# Patient Record
Sex: FEMALE | Race: OTHER | NOT HISPANIC OR LATINO | URBAN - METROPOLITAN AREA
[De-identification: names, ages, dates, MRNs, and addresses within clinical notes are randomized per-mention and may not be internally consistent; named-entity substitution may affect disease eponyms.]

---

## 2018-07-21 ENCOUNTER — EMERGENCY (EMERGENCY)
Facility: HOSPITAL | Age: 33
LOS: 1 days | Discharge: LEFT BEFORE DISCHARGE | End: 2018-07-21
Attending: EMERGENCY MEDICINE
Payer: COMMERCIAL

## 2018-07-21 VITALS
RESPIRATION RATE: 15 BRPM | TEMPERATURE: 98 F | HEART RATE: 84 BPM | SYSTOLIC BLOOD PRESSURE: 108 MMHG | DIASTOLIC BLOOD PRESSURE: 67 MMHG | OXYGEN SATURATION: 100 %

## 2018-07-21 VITALS
TEMPERATURE: 98 F | HEART RATE: 88 BPM | SYSTOLIC BLOOD PRESSURE: 111 MMHG | RESPIRATION RATE: 15 BRPM | DIASTOLIC BLOOD PRESSURE: 69 MMHG

## 2018-07-21 LAB
ALBUMIN SERPL ELPH-MCNC: 3.6 G/DL — SIGNIFICANT CHANGE UP (ref 3.3–5)
ALP SERPL-CCNC: 48 U/L — SIGNIFICANT CHANGE UP (ref 40–120)
ALT FLD-CCNC: 21 U/L — SIGNIFICANT CHANGE UP (ref 12–78)
ANION GAP SERPL CALC-SCNC: 8 MMOL/L — SIGNIFICANT CHANGE UP (ref 5–17)
APPEARANCE UR: CLEAR — SIGNIFICANT CHANGE UP
AST SERPL-CCNC: 17 U/L — SIGNIFICANT CHANGE UP (ref 15–37)
BASOPHILS # BLD AUTO: 0.02 K/UL — SIGNIFICANT CHANGE UP (ref 0–0.2)
BASOPHILS NFR BLD AUTO: 0.2 % — SIGNIFICANT CHANGE UP (ref 0–2)
BILIRUB SERPL-MCNC: 0.4 MG/DL — SIGNIFICANT CHANGE UP (ref 0.2–1.2)
BILIRUB UR-MCNC: NEGATIVE — SIGNIFICANT CHANGE UP
BUN SERPL-MCNC: 11 MG/DL — SIGNIFICANT CHANGE UP (ref 7–23)
CALCIUM SERPL-MCNC: 9 MG/DL — SIGNIFICANT CHANGE UP (ref 8.5–10.1)
CHLORIDE SERPL-SCNC: 105 MMOL/L — SIGNIFICANT CHANGE UP (ref 96–108)
CO2 SERPL-SCNC: 26 MMOL/L — SIGNIFICANT CHANGE UP (ref 22–31)
COLOR SPEC: YELLOW — SIGNIFICANT CHANGE UP
CREAT SERPL-MCNC: 0.52 MG/DL — SIGNIFICANT CHANGE UP (ref 0.5–1.3)
DIFF PNL FLD: NEGATIVE — SIGNIFICANT CHANGE UP
EOSINOPHIL # BLD AUTO: 0.05 K/UL — SIGNIFICANT CHANGE UP (ref 0–0.5)
EOSINOPHIL NFR BLD AUTO: 0.6 % — SIGNIFICANT CHANGE UP (ref 0–6)
GLUCOSE SERPL-MCNC: 82 MG/DL — SIGNIFICANT CHANGE UP (ref 70–99)
GLUCOSE UR QL: NEGATIVE — SIGNIFICANT CHANGE UP
HCG SERPL-ACNC: <1 MIU/ML — SIGNIFICANT CHANGE UP
HCT VFR BLD CALC: 39.7 % — SIGNIFICANT CHANGE UP (ref 34.5–45)
HGB BLD-MCNC: 13.7 G/DL — SIGNIFICANT CHANGE UP (ref 11.5–15.5)
IMM GRANULOCYTES NFR BLD AUTO: 0.2 % — SIGNIFICANT CHANGE UP (ref 0–1.5)
KETONES UR-MCNC: NEGATIVE — SIGNIFICANT CHANGE UP
LACTATE SERPL-SCNC: 1.3 MMOL/L — SIGNIFICANT CHANGE UP (ref 0.7–2)
LEUKOCYTE ESTERASE UR-ACNC: NEGATIVE — SIGNIFICANT CHANGE UP
LIDOCAIN IGE QN: 113 U/L — SIGNIFICANT CHANGE UP (ref 73–393)
LYMPHOCYTES # BLD AUTO: 1.8 K/UL — SIGNIFICANT CHANGE UP (ref 1–3.3)
LYMPHOCYTES # BLD AUTO: 22.2 % — SIGNIFICANT CHANGE UP (ref 13–44)
MCHC RBC-ENTMCNC: 32.2 PG — SIGNIFICANT CHANGE UP (ref 27–34)
MCHC RBC-ENTMCNC: 34.5 GM/DL — SIGNIFICANT CHANGE UP (ref 32–36)
MCV RBC AUTO: 93.4 FL — SIGNIFICANT CHANGE UP (ref 80–100)
MONOCYTES # BLD AUTO: 0.48 K/UL — SIGNIFICANT CHANGE UP (ref 0–0.9)
MONOCYTES NFR BLD AUTO: 5.9 % — SIGNIFICANT CHANGE UP (ref 2–14)
NEUTROPHILS # BLD AUTO: 5.75 K/UL — SIGNIFICANT CHANGE UP (ref 1.8–7.4)
NEUTROPHILS NFR BLD AUTO: 70.9 % — SIGNIFICANT CHANGE UP (ref 43–77)
NITRITE UR-MCNC: NEGATIVE — SIGNIFICANT CHANGE UP
PH UR: 6.5 — SIGNIFICANT CHANGE UP (ref 5–8)
PLATELET # BLD AUTO: 262 K/UL — SIGNIFICANT CHANGE UP (ref 150–400)
POTASSIUM SERPL-MCNC: 4.2 MMOL/L — SIGNIFICANT CHANGE UP (ref 3.5–5.3)
POTASSIUM SERPL-SCNC: 4.2 MMOL/L — SIGNIFICANT CHANGE UP (ref 3.5–5.3)
PROT SERPL-MCNC: 7.4 G/DL — SIGNIFICANT CHANGE UP (ref 6–8.3)
PROT UR-MCNC: NEGATIVE — SIGNIFICANT CHANGE UP
RBC # BLD: 4.25 M/UL — SIGNIFICANT CHANGE UP (ref 3.8–5.2)
RBC # FLD: 11.9 % — SIGNIFICANT CHANGE UP (ref 10.3–14.5)
SODIUM SERPL-SCNC: 139 MMOL/L — SIGNIFICANT CHANGE UP (ref 135–145)
SP GR SPEC: 1.01 — SIGNIFICANT CHANGE UP (ref 1.01–1.02)
UROBILINOGEN FLD QL: NEGATIVE — SIGNIFICANT CHANGE UP
WBC # BLD: 8.12 K/UL — SIGNIFICANT CHANGE UP (ref 3.8–10.5)
WBC # FLD AUTO: 8.12 K/UL — SIGNIFICANT CHANGE UP (ref 3.8–10.5)

## 2018-07-21 PROCEDURE — 96374 THER/PROPH/DIAG INJ IV PUSH: CPT

## 2018-07-21 PROCEDURE — 83690 ASSAY OF LIPASE: CPT

## 2018-07-21 PROCEDURE — 99285 EMERGENCY DEPT VISIT HI MDM: CPT

## 2018-07-21 PROCEDURE — 74177 CT ABD & PELVIS W/CONTRAST: CPT

## 2018-07-21 PROCEDURE — 74177 CT ABD & PELVIS W/CONTRAST: CPT | Mod: 26

## 2018-07-21 PROCEDURE — 85027 COMPLETE CBC AUTOMATED: CPT

## 2018-07-21 PROCEDURE — 83605 ASSAY OF LACTIC ACID: CPT

## 2018-07-21 PROCEDURE — 84702 CHORIONIC GONADOTROPIN TEST: CPT

## 2018-07-21 PROCEDURE — 81003 URINALYSIS AUTO W/O SCOPE: CPT

## 2018-07-21 PROCEDURE — 80053 COMPREHEN METABOLIC PANEL: CPT

## 2018-07-21 PROCEDURE — 96361 HYDRATE IV INFUSION ADD-ON: CPT

## 2018-07-21 PROCEDURE — 99284 EMERGENCY DEPT VISIT MOD MDM: CPT | Mod: 25

## 2018-07-21 RX ORDER — SPIRONOLACTONE 25 MG/1
0 TABLET, FILM COATED ORAL
Qty: 0 | Refills: 0 | COMMUNITY

## 2018-07-21 RX ORDER — PIPERACILLIN AND TAZOBACTAM 4; .5 G/20ML; G/20ML
3.38 INJECTION, POWDER, LYOPHILIZED, FOR SOLUTION INTRAVENOUS ONCE
Qty: 0 | Refills: 0 | Status: COMPLETED | OUTPATIENT
Start: 2018-07-21 | End: 2018-07-21

## 2018-07-21 RX ORDER — ACETAMINOPHEN 500 MG
650 TABLET ORAL ONCE
Qty: 0 | Refills: 0 | Status: COMPLETED | OUTPATIENT
Start: 2018-07-21 | End: 2018-07-21

## 2018-07-21 RX ORDER — ONDANSETRON 8 MG/1
4 TABLET, FILM COATED ORAL ONCE
Qty: 0 | Refills: 0 | Status: COMPLETED | OUTPATIENT
Start: 2018-07-21 | End: 2018-07-21

## 2018-07-21 RX ORDER — IOHEXOL 300 MG/ML
30 INJECTION, SOLUTION INTRAVENOUS ONCE
Qty: 0 | Refills: 0 | Status: COMPLETED | OUTPATIENT
Start: 2018-07-21 | End: 2018-07-21

## 2018-07-21 RX ORDER — NORETHINDRONE AND ETHINYL ESTRADIOL 0.4-0.035
0 KIT ORAL
Qty: 0 | Refills: 0 | COMMUNITY

## 2018-07-21 RX ORDER — SODIUM CHLORIDE 9 MG/ML
1000 INJECTION INTRAMUSCULAR; INTRAVENOUS; SUBCUTANEOUS ONCE
Qty: 0 | Refills: 0 | Status: COMPLETED | OUTPATIENT
Start: 2018-07-21 | End: 2018-07-21

## 2018-07-21 RX ADMIN — IOHEXOL 30 MILLILITER(S): 300 INJECTION, SOLUTION INTRAVENOUS at 11:35

## 2018-07-21 RX ADMIN — ONDANSETRON 4 MILLIGRAM(S): 8 TABLET, FILM COATED ORAL at 11:35

## 2018-07-21 RX ADMIN — SODIUM CHLORIDE 1000 MILLILITER(S): 9 INJECTION INTRAMUSCULAR; INTRAVENOUS; SUBCUTANEOUS at 11:34

## 2018-07-21 RX ADMIN — Medication 650 MILLIGRAM(S): at 11:35

## 2018-07-21 RX ADMIN — SODIUM CHLORIDE 1000 MILLILITER(S): 9 INJECTION INTRAMUSCULAR; INTRAVENOUS; SUBCUTANEOUS at 16:17

## 2018-07-21 RX ADMIN — Medication 650 MILLIGRAM(S): at 16:17

## 2018-07-21 NOTE — ED PROVIDER NOTE - OBJECTIVE STATEMENT
31 yo female hx of PCOS c/o epigastric/LUQ pain x 1 days with associated nausea, no vomiting/diarrhea.  No fever/chills.  No hx of abdominal surgeries.

## 2018-07-21 NOTE — H&P ADULT - NSHPREVIEWOFSYSTEMS_GEN_ALL_CORE
REVIEW OF SYSTEMS:    CONSTITUTIONAL: No weakness, fevers or chills  EYES/ENT: No visual changes;  No vertigo or throat pain   NECK: No pain or stiffness  RESPIRATORY: No cough, wheezing, hemoptysis; No shortness of breath  CARDIOVASCULAR: No chest pain or palpitations  GASTROINTESTINAL: abdominal pain. nausea, vomiting, no  hematemesis; No diarrhea or constipation. No melena or hematochezia.  GENITOURINARY: No dysuria, frequency or hematuria  NEUROLOGICAL: No numbness or weakness  SKIN: No itching, burning, rashes, or lesions   All other review of systems is negative unless indicated above.

## 2018-07-21 NOTE — ED PROVIDER NOTE - PROGRESS NOTE DETAILS
patient requesting transfer to [facility].  Dr. Nelson accepting patient does not want to pay for ambulance charge since she his requesting for transfer.  Will leave against medical advice and drive to [place] with her family. The patient has decided to leave against medical advice (AMA).  I have made reasonable attempts to explain to the patient that leaving prior to completion of work up and treatment may result in recurrent or worsening of symptoms, severe permanent disability, pain and suffering, harm, injury, and/or death.  I have explained the risks, benefits, and alternatives to treatment as well as the attendant risks of refusing treatment at this time.  The patient has demonstrated comprehension and verbalizes understanding of these risks.  The patient has been told that they must return to the ER immediately for persistent or recurring symptoms, worsening symptoms, or any concerning symptoms.  The patient has also been informed that they may return to the ER immediately at any time if they change their mind and wish to resume care. The patient has been given the opportunity to ask questions and have them fully answered.

## 2018-07-21 NOTE — ED PROVIDER NOTE - CARE PLAN
Principal Discharge DX:	Left upper quadrant pain Principal Discharge DX:	Left upper quadrant pain  Secondary Diagnosis:	Acute appendicitis, unspecified acute appendicitis type

## 2018-07-21 NOTE — H&P ADULT - HISTORY OF PRESENT ILLNESS
33 yo fem c/o periumbilical, abdominal pain started yesterday along nausea, decreased appetite, pain 8/10, not relieved by anything. Pain worsened this am . CT Abd showed dilated appendix 13mm, no contrast filling appendix, thickening of appendix base consistent with appendicitis.

## 2018-07-21 NOTE — H&P ADULT - NSHPLABSRESULTS_GEN_ALL_CORE
Vital Signs Last 24 Hrs  T(C): 36.8 (21 Jul 2018 10:58), Max: 36.8 (21 Jul 2018 10:58)  T(F): 98.3 (21 Jul 2018 10:58), Max: 98.3 (21 Jul 2018 10:58)  HR: 88 (21 Jul 2018 10:58) (88 - 88)  BP: 111/69 (21 Jul 2018 10:58) (111/69 - 111/69)  BP(mean): --  RR: 15 (21 Jul 2018 10:58) (15 - 15)  SpO2: --                          13.7   8.12  )-----------( 262      ( 21 Jul 2018 11:29 )             39.7       07-21    139  |  105  |  11  ----------------------------<  82  4.2   |  26  |  0.52    Ca    9.0      21 Jul 2018 11:29    TPro  7.4  /  Alb  3.6  /  TBili  0.4  /  DBili  x   /  AST  17  /  ALT  21  /  AlkPhos  48  07-21      LIVER FUNCTIONS - ( 21 Jul 2018 11:29 )  Alb: 3.6 g/dL / Pro: 7.4 g/dL / ALK PHOS: 48 U/L / ALT: 21 U/L / AST: 17 U/L / GGT: x             MEDICATIONS  (STANDING):    MEDICATIONS  (PRN):      MEDICATIONS  (STANDING):      < from: CT Abdomen and Pelvis w/ Oral Cont and w/ IV Cont (07.21.18 @ 13:26) >    FINDINGS:      The liver, spleen and gallbladder appear unremarkable.    The adrenal glands and pancreas are unremarkable in appearance.    No hydroureteronephrosis is noted.    The abdominal aorta is normal in caliber.  No enlarged retroperitoneal lymphadenopathy is noted.    The evaluation of the stomach is limited without distention.  No bowel obstruction is noted.  No localized intra-abdominal fluid collection or pneumoperitoneum is   noted.    The appendix is dilated, measuring up to 13 mm, and does not fill with   contrast. It is fluid and debris filled, with thickening at the   appendiceal base and cecal apex. This finding represents an interval   change from prior exam.  No periappendiceal abscess is noted.    There is a trace amount of free fluid within the posterior pelvis.  The urinary bladder is moderately distended, correlate clinically with   urine output.    Impression:    CT findings as discussed, which may represent acute appendicitis,   recommend further clinical correlation.  This finding was discussed with Dr. Stiles at the time of interpretation on   7/21/2018.    < end of copied text >

## 2018-07-21 NOTE — H&P ADULT - NSHPPHYSICALEXAM_GEN_ALL_CORE
.  VITAL SIGNS:  T(C): 36.8 (07-21-18 @ 10:58), Max: 36.8 (07-21-18 @ 10:58)  T(F): 98.3 (07-21-18 @ 10:58), Max: 98.3 (07-21-18 @ 10:58)  HR: 88 (07-21-18 @ 10:58) (88 - 88)  BP: 111/69 (07-21-18 @ 10:58) (111/69 - 111/69)  BP(mean): --  RR: 15 (07-21-18 @ 10:58) (15 - 15)  SpO2: --  Wt(kg): --    PHYSICAL EXAM:    Constitutional: resting comfortably in bed; NAD  Eyes: PERRL, EOMI, anicteric sclera  ENT: no nasal discharge; uvula midline, no oropharyngeal erythema or exudates  Neck: supple; no JVD or thyromegaly  Respiratory: CTA B/L; no W/R/R, no retractions  Cardiac: +S1/S2; RRR; no murmurs  Gastrointestinal: soft, RLQ tenderness  Back: spine midline, no bony tenderness or step-offs; no CVAT B/L  Extremities: no clubbing or cyanosis; no peripheral edema  Musculoskeletal: NROM x4; no joint swelling, tenderness or erythema  Vascular: 2+ radial, femoral, DP/PT pulses B/L  Dermatologic: skin warm, dry and intact; no rashes, wounds, or scars  Lymphatic: no submandibular or cervical LAD  Neurologic: AAOx3; CNII-XII grossly intact; no focal deficits  Psychiatric: affect and characteristics of appearance, verbalizations, behaviors are appropriate

## 2018-07-21 NOTE — H&P ADULT - ASSESSMENT
33 yo fem with acute appendicitis  -Patient is thinking about going to [facility] where she works or staying here, awaiting for patient's decision  -Risk/benefit of lap appendectomy explained to patient, including but not limited to intrabdominal abscess, wound infection, DVT, PE, pneumonia  -IV cefotetan  -NPO

## 2020-12-23 NOTE — ED ADULT NURSE NOTE - PSH
Addended by: CHELA BORJAS on: 12/23/2020 02:45 PM     Modules accepted: Orders     No significant past surgical history

## 2021-09-07 ENCOUNTER — EMERGENCY (EMERGENCY)
Facility: HOSPITAL | Age: 36
LOS: 1 days | Discharge: HOME | End: 2021-09-07
Attending: EMERGENCY MEDICINE | Admitting: EMERGENCY MEDICINE
Payer: COMMERCIAL

## 2021-09-07 ENCOUNTER — TRANSCRIPTION ENCOUNTER (OUTPATIENT)
Age: 36
End: 2021-09-07

## 2021-09-07 VITALS
WEIGHT: 134.92 LBS | RESPIRATION RATE: 17 BRPM | TEMPERATURE: 98 F | OXYGEN SATURATION: 100 % | HEART RATE: 78 BPM | SYSTOLIC BLOOD PRESSURE: 112 MMHG | DIASTOLIC BLOOD PRESSURE: 70 MMHG | HEIGHT: 66 IN

## 2021-09-07 VITALS
TEMPERATURE: 98 F | HEART RATE: 80 BPM | DIASTOLIC BLOOD PRESSURE: 71 MMHG | OXYGEN SATURATION: 100 % | SYSTOLIC BLOOD PRESSURE: 106 MMHG | RESPIRATION RATE: 16 BRPM

## 2021-09-07 PROBLEM — E28.2 POLYCYSTIC OVARIAN SYNDROME: Chronic | Status: ACTIVE | Noted: 2018-07-21

## 2021-09-07 LAB
ALBUMIN SERPL ELPH-MCNC: 4 G/DL — SIGNIFICANT CHANGE UP (ref 3.3–5)
ALP SERPL-CCNC: 62 U/L — SIGNIFICANT CHANGE UP (ref 40–120)
ALT FLD-CCNC: 59 U/L — SIGNIFICANT CHANGE UP (ref 12–78)
ANION GAP SERPL CALC-SCNC: 3 MMOL/L — LOW (ref 5–17)
AST SERPL-CCNC: 32 U/L — SIGNIFICANT CHANGE UP (ref 15–37)
BASOPHILS # BLD AUTO: 0.03 K/UL — SIGNIFICANT CHANGE UP (ref 0–0.2)
BASOPHILS NFR BLD AUTO: 0.5 % — SIGNIFICANT CHANGE UP (ref 0–2)
BILIRUB SERPL-MCNC: 0.5 MG/DL — SIGNIFICANT CHANGE UP (ref 0.2–1.2)
BUN SERPL-MCNC: 11 MG/DL — SIGNIFICANT CHANGE UP (ref 7–23)
CALCIUM SERPL-MCNC: 9.5 MG/DL — SIGNIFICANT CHANGE UP (ref 8.5–10.1)
CHLORIDE SERPL-SCNC: 107 MMOL/L — SIGNIFICANT CHANGE UP (ref 96–108)
CO2 SERPL-SCNC: 29 MMOL/L — SIGNIFICANT CHANGE UP (ref 22–31)
CREAT SERPL-MCNC: 0.44 MG/DL — LOW (ref 0.5–1.3)
EOSINOPHIL # BLD AUTO: 0.02 K/UL — SIGNIFICANT CHANGE UP (ref 0–0.5)
EOSINOPHIL NFR BLD AUTO: 0.3 % — SIGNIFICANT CHANGE UP (ref 0–6)
GLUCOSE SERPL-MCNC: 83 MG/DL — SIGNIFICANT CHANGE UP (ref 70–99)
HCG SERPL-ACNC: <1 MIU/ML — SIGNIFICANT CHANGE UP
HCT VFR BLD CALC: 41.6 % — SIGNIFICANT CHANGE UP (ref 34.5–45)
HGB BLD-MCNC: 13.8 G/DL — SIGNIFICANT CHANGE UP (ref 11.5–15.5)
IMM GRANULOCYTES NFR BLD AUTO: 0.3 % — SIGNIFICANT CHANGE UP (ref 0–1.5)
LYMPHOCYTES # BLD AUTO: 2.59 K/UL — SIGNIFICANT CHANGE UP (ref 1–3.3)
LYMPHOCYTES # BLD AUTO: 39.2 % — SIGNIFICANT CHANGE UP (ref 13–44)
MCHC RBC-ENTMCNC: 31.4 PG — SIGNIFICANT CHANGE UP (ref 27–34)
MCHC RBC-ENTMCNC: 33.2 GM/DL — SIGNIFICANT CHANGE UP (ref 32–36)
MCV RBC AUTO: 94.5 FL — SIGNIFICANT CHANGE UP (ref 80–100)
MONOCYTES # BLD AUTO: 0.45 K/UL — SIGNIFICANT CHANGE UP (ref 0–0.9)
MONOCYTES NFR BLD AUTO: 6.8 % — SIGNIFICANT CHANGE UP (ref 2–14)
NEUTROPHILS # BLD AUTO: 3.5 K/UL — SIGNIFICANT CHANGE UP (ref 1.8–7.4)
NEUTROPHILS NFR BLD AUTO: 52.9 % — SIGNIFICANT CHANGE UP (ref 43–77)
NRBC # BLD: 0 /100 WBCS — SIGNIFICANT CHANGE UP (ref 0–0)
PLATELET # BLD AUTO: 219 K/UL — SIGNIFICANT CHANGE UP (ref 150–400)
POTASSIUM SERPL-MCNC: 4.6 MMOL/L — SIGNIFICANT CHANGE UP (ref 3.5–5.3)
POTASSIUM SERPL-SCNC: 4.6 MMOL/L — SIGNIFICANT CHANGE UP (ref 3.5–5.3)
PROT SERPL-MCNC: 7.5 G/DL — SIGNIFICANT CHANGE UP (ref 6–8.3)
RBC # BLD: 4.4 M/UL — SIGNIFICANT CHANGE UP (ref 3.8–5.2)
RBC # FLD: 11.9 % — SIGNIFICANT CHANGE UP (ref 10.3–14.5)
SODIUM SERPL-SCNC: 139 MMOL/L — SIGNIFICANT CHANGE UP (ref 135–145)
WBC # BLD: 6.61 K/UL — SIGNIFICANT CHANGE UP (ref 3.8–10.5)
WBC # FLD AUTO: 6.61 K/UL — SIGNIFICANT CHANGE UP (ref 3.8–10.5)

## 2021-09-07 PROCEDURE — 96365 THER/PROPH/DIAG IV INF INIT: CPT | Mod: XU

## 2021-09-07 PROCEDURE — 99284 EMERGENCY DEPT VISIT MOD MDM: CPT | Mod: 25

## 2021-09-07 PROCEDURE — 70487 CT MAXILLOFACIAL W/DYE: CPT | Mod: MA

## 2021-09-07 PROCEDURE — 80053 COMPREHEN METABOLIC PANEL: CPT

## 2021-09-07 PROCEDURE — 96375 TX/PRO/DX INJ NEW DRUG ADDON: CPT | Mod: XU

## 2021-09-07 PROCEDURE — 85025 COMPLETE CBC W/AUTO DIFF WBC: CPT

## 2021-09-07 PROCEDURE — 96366 THER/PROPH/DIAG IV INF ADDON: CPT

## 2021-09-07 PROCEDURE — 36415 COLL VENOUS BLD VENIPUNCTURE: CPT

## 2021-09-07 PROCEDURE — 70487 CT MAXILLOFACIAL W/DYE: CPT | Mod: 26,MA

## 2021-09-07 PROCEDURE — 99285 EMERGENCY DEPT VISIT HI MDM: CPT

## 2021-09-07 PROCEDURE — 84702 CHORIONIC GONADOTROPIN TEST: CPT

## 2021-09-07 RX ORDER — SODIUM CHLORIDE 9 MG/ML
1000 INJECTION INTRAMUSCULAR; INTRAVENOUS; SUBCUTANEOUS ONCE
Refills: 0 | Status: COMPLETED | OUTPATIENT
Start: 2021-09-07 | End: 2021-09-07

## 2021-09-07 RX ORDER — DEXAMETHASONE 0.5 MG/5ML
10 ELIXIR ORAL ONCE
Refills: 0 | Status: COMPLETED | OUTPATIENT
Start: 2021-09-07 | End: 2021-09-07

## 2021-09-07 RX ADMIN — SODIUM CHLORIDE 1000 MILLILITER(S): 9 INJECTION INTRAMUSCULAR; INTRAVENOUS; SUBCUTANEOUS at 13:33

## 2021-09-07 RX ADMIN — SODIUM CHLORIDE 1000 MILLILITER(S): 9 INJECTION INTRAMUSCULAR; INTRAVENOUS; SUBCUTANEOUS at 15:46

## 2021-09-07 RX ADMIN — Medication 100 MILLIGRAM(S): at 13:33

## 2021-09-07 RX ADMIN — Medication 102 MILLIGRAM(S): at 15:46

## 2021-09-07 RX ADMIN — Medication 900 MILLIGRAM(S): at 15:46

## 2021-09-07 NOTE — ED PROVIDER NOTE - PATIENT PORTAL LINK FT
You can access the FollowMyHealth Patient Portal offered by [facility] by registering at the following website: [email or web address]. By joining [employer]’s FollowMyHealth portal, you will also be able to view your health information using other applications (apps) compatible with our system.

## 2021-09-07 NOTE — ED PROVIDER NOTE - ENMT, MLM
Airway patent, tenderness to left upper gumline with no apical abscess. no induration to floor or mouth. no trismus, handles secretions without difficulty. mild left sided facial swelling present Airway patent, tenderness to left upper gumline and over left upper tooth with no apical abscess. no induration to floor or mouth. no trismus, handles secretions without difficulty. mild left sided facial swelling present

## 2021-09-07 NOTE — ED PROVIDER NOTE - OBJECTIVE STATEMENT
36 year old female with history of PCOS presents with left sided facial pain and swelling. had cavity filled 2-3 months to left upper tooth and then started Invisalign shortly afterwards. was having some sensitivity to the tooth on and off since then. increased pain about 4-5 days ago. was put on augmentin 4 days ago by doctor that she works with and has been using chlorhexidine mouth rinses. reports improvement and swelling started to get worse. was seen by dentist this am and switched to amoxicillin (rather than augmentin). had x-rays and told to follow up with endodontist for possible root canal. has appt scheduled in 6 days with endodontist. patient has concerns for possible abscess. pain moderate in nature. no fevers.

## 2021-09-07 NOTE — ED PROVIDER NOTE - PROGRESS NOTE DETAILS
Reevaluated patient at bedside.  Patient feeling improved.  Discussed the results of all diagnostic testing in ED and copies of all reports given. incidental finding of cyst in brain discussed. recommend follow up with neurology and outpatient MRI. referral to neurology provided. will have close follow up with oral surgeon. has appt scheduled in 5 days.   An opportunity to ask questions was given.  Discussed the importance of prompt, close medical follow-up.  Patient will return with any changes, concerns or persistent / worsening symptoms.  Understanding of all instructions verbalized.

## 2021-09-07 NOTE — ED PROVIDER NOTE - CARE PROVIDER_API CALL
Thao Newby  NEUROLOGY  [address] [facility] [address]  [place] [address]  Phone: [phone]  Fax: [phone]  Follow Up Time: 1-3 Days

## 2021-09-07 NOTE — ED ADULT NURSE NOTE - PRO INTERPRETER NEED 2
Date of Service: 04/03/2018    FOLLOWUP:  Incision and drainage of infected back epidermal inclusion cyst on 04/27/2018.    The incision is rapidly healing by secondary intention.  No pus.  No signs of infection.    PLAN:  Return to clinic in 2 weeks.      Dictated By: Kilo Talbert MD  Signing Provider: Kilo Talbert MD    /FABIAN ([id number])  DD: 04/04/2018 04:59:25 TD: 04/04/2018 08:49:04    Copy Sent To:    English

## 2021-09-07 NOTE — ED PROVIDER NOTE - ATTENDING CONTRIBUTION TO CARE
37 yo white female with worsening left upper toothache x few days. Had same area worked on  for cavitation few weeks ago. Tooth throbbing and sensitive. Dentist placed patient on Amox with little relief. No fever or chills. Exam revealed thin white female in NAD with moderate pain on percussion left upper 1st molar. No adenopathy. I agree with plan and management outlined by PA.

## 2021-09-07 NOTE — ED PROVIDER NOTE - NSFOLLOWUPINSTRUCTIONS_ED_ALL_ED_FT
start taking clindamycin since no improvement on augmentin   warm compresses to face  have close follow up with oral surgeon   incidental finding of cyst on brain, recommend outpatient follow up with neurology. may need outpatient MRI. referral to neurology provided         Dental Pain    Dental pain may be caused by many things, including:  •Tooth decay (cavities or caries).      •Infection.      •The inner part of the tooth being filled with pus (abscess).      •Injury.      Sometimes the cause of pain is unknown.  Your pain can vary. It may be mild or severe. You may have it all the time, or it may occur only when you are:  •Chewing.      •Exposed to hot or cold temperature.      •Eating or drinking sugary foods or beverages, such as soda or candy.        Follow these instructions at home:    Medicines     •Take over-the-counter and prescription medicines only as told by your doctor.      •If you were prescribed an antibiotic medicine, take it as told by your doctor. Do not stop taking the medicine even if you start to feel better.      Eating and drinking   • Do not eat foods or drinks that cause you pain. These include:  •Very hot or very cold foods or drinks.      •Sweet or sugary foods or drinks.          Managing pain and swelling      •Gargle with a salt-water mixture 3–4 times a day. To make this, dissolve ½–1 tsp of salt in 1 cup of warm water.    •If told, put ice on the painful area of your face:  •Put ice in a plastic bag.       •Place a towel between your skin and the bag.       •Leave the ice on for 20 minutes, 2–3 times a day.        Brushing your teeth     •Brush your teeth twice a day using a fluoride toothpaste.       •Floss your teeth once a day.      •Use a toothpaste made for sensitive teeth as told by your doctor.      •Use a soft toothbrush.      General instructions     • Do not apply heat to the outside of your face.      •Watch your dental pain. Let your doctor know if there are any changes.      •Keep all follow-up visits as told by your doctor. This is important.        Contact a doctor if:    •Your pain is not relieved by medicines.      •You have new symptoms.      •Your symptoms get worse.        Get help right away if:    •You cannot open your mouth.      •You are having trouble breathing or swallowing.      •You have a fever.      •Your face, neck, or jaw is swollen.        Summary    •Dental pain may be caused by many things, including tooth decay, injury, or infection. In some cases, the cause is not known.      •Your pain may be mild or severe. You may have pain all the time, or you may have it only when you eat or drink.      •Take over-the-counter and prescription medicines only as told by your doctor.      •Watch your dental pain for any changes. Let your doctor know if symptoms get worse.      This information is not intended to replace advice given to you by your health care provider. Make sure you discuss any questions you have with your health care provider.

## 2021-09-07 NOTE — ED ADULT TRIAGE NOTE - CHIEF COMPLAINT QUOTE
"Tiffanie been having issues with my tooth and now my cheek is swollen. Im on antibx but I don't think they are working"

## 2022-09-01 ENCOUNTER — OFFICE (OUTPATIENT)
Dept: URBAN - METROPOLITAN AREA CLINIC 109 | Facility: CLINIC | Age: 37
Setting detail: OPHTHALMOLOGY
End: 2022-09-01
Payer: COMMERCIAL

## 2022-09-01 ENCOUNTER — RX ONLY (RX ONLY)
Age: 37
End: 2022-09-01

## 2022-09-01 DIAGNOSIS — H16.142: ICD-10-CM

## 2022-09-01 PROCEDURE — 92002 INTRM OPH EXAM NEW PATIENT: CPT | Performed by: OPHTHALMOLOGY

## 2022-09-01 ASSESSMENT — CONFRONTATIONAL VISUAL FIELD TEST (CVF)
OD_FINDINGS: FULL
OS_FINDINGS: FULL

## 2022-09-01 ASSESSMENT — TONOMETRY
OD_IOP_MMHG: 10
OS_IOP_MMHG: 10

## 2022-09-01 ASSESSMENT — VISUAL ACUITY
OD_BCVA: 20/20
OS_BCVA: 20/20

## 2022-09-08 ENCOUNTER — RX ONLY (RX ONLY)
Age: 37
End: 2022-09-08

## 2022-09-08 ENCOUNTER — OFFICE (OUTPATIENT)
Dept: URBAN - METROPOLITAN AREA CLINIC 109 | Facility: CLINIC | Age: 37
Setting detail: OPHTHALMOLOGY
End: 2022-09-08
Payer: COMMERCIAL

## 2022-09-08 DIAGNOSIS — H40.013: ICD-10-CM

## 2022-09-08 DIAGNOSIS — H16.142: ICD-10-CM

## 2022-09-08 PROCEDURE — 99213 OFFICE O/P EST LOW 20 MIN: CPT | Performed by: OPHTHALMOLOGY

## 2022-09-08 PROCEDURE — 76514 ECHO EXAM OF EYE THICKNESS: CPT | Performed by: OPHTHALMOLOGY

## 2022-09-08 ASSESSMENT — PACHYMETRY
OS_CT_UM: 566
OS_CT_CORRECTION: -1
OD_CT_UM: 553
OD_CT_CORRECTION: -1

## 2022-09-08 ASSESSMENT — VISUAL ACUITY
OD_BCVA: 20/20
OS_BCVA: 20/20

## 2022-09-08 ASSESSMENT — TONOMETRY
OD_IOP_MMHG: 12
OS_IOP_MMHG: 15

## 2022-09-20 ENCOUNTER — OFFICE (OUTPATIENT)
Dept: URBAN - METROPOLITAN AREA CLINIC 109 | Facility: CLINIC | Age: 37
Setting detail: OPHTHALMOLOGY
End: 2022-09-20
Payer: COMMERCIAL

## 2022-09-20 DIAGNOSIS — H16.142: ICD-10-CM

## 2022-09-20 PROCEDURE — 92012 INTRM OPH EXAM EST PATIENT: CPT | Performed by: OPHTHALMOLOGY

## 2022-09-20 ASSESSMENT — TONOMETRY: OS_IOP_MMHG: 16

## 2022-09-20 ASSESSMENT — PACHYMETRY
OD_CT_UM: 553
OS_CT_CORRECTION: -1
OD_CT_CORRECTION: -1
OS_CT_UM: 566

## 2022-09-20 ASSESSMENT — CONFRONTATIONAL VISUAL FIELD TEST (CVF)
OD_FINDINGS: FULL
OS_FINDINGS: FULL

## 2022-09-20 ASSESSMENT — VISUAL ACUITY
OD_BCVA: 20/20
OS_BCVA: 20/20

## 2022-10-04 ENCOUNTER — OFFICE (OUTPATIENT)
Dept: URBAN - METROPOLITAN AREA CLINIC 109 | Facility: CLINIC | Age: 37
Setting detail: OPHTHALMOLOGY
End: 2022-10-04
Payer: COMMERCIAL

## 2022-10-04 DIAGNOSIS — H10.232: ICD-10-CM

## 2022-10-04 PROBLEM — H16.142: Status: RESOLVED | Noted: 2022-09-01 | Resolved: 2022-10-04

## 2022-10-04 PROCEDURE — 99213 OFFICE O/P EST LOW 20 MIN: CPT | Performed by: OPHTHALMOLOGY

## 2022-10-04 ASSESSMENT — PACHYMETRY
OS_CT_CORRECTION: -1
OD_CT_UM: 553
OD_CT_CORRECTION: -1
OS_CT_UM: 566

## 2022-10-04 ASSESSMENT — CONFRONTATIONAL VISUAL FIELD TEST (CVF)
OD_FINDINGS: FULL
OS_FINDINGS: FULL

## 2022-10-04 ASSESSMENT — VISUAL ACUITY
OD_BCVA: 20/20
OS_BCVA: 20/20

## 2022-10-04 ASSESSMENT — TONOMETRY: OS_IOP_MMHG: 13

## 2022-10-11 ENCOUNTER — OFFICE (OUTPATIENT)
Dept: URBAN - METROPOLITAN AREA CLINIC 109 | Facility: CLINIC | Age: 37
Setting detail: OPHTHALMOLOGY
End: 2022-10-11
Payer: COMMERCIAL

## 2022-10-11 DIAGNOSIS — H10.232: ICD-10-CM

## 2022-10-11 DIAGNOSIS — H43.393: ICD-10-CM

## 2022-10-11 DIAGNOSIS — H40.013: ICD-10-CM

## 2022-10-11 DIAGNOSIS — H16.222: ICD-10-CM

## 2022-10-11 PROCEDURE — 92201 OPSCPY EXTND RTA DRAW UNI/BI: CPT | Performed by: OPHTHALMOLOGY

## 2022-10-11 PROCEDURE — 92014 COMPRE OPH EXAM EST PT 1/>: CPT | Performed by: OPHTHALMOLOGY

## 2022-10-11 ASSESSMENT — SUPERFICIAL PUNCTATE KERATITIS (SPK): OS_SPK: T

## 2022-10-11 ASSESSMENT — CONFRONTATIONAL VISUAL FIELD TEST (CVF)
OD_FINDINGS: FULL
OS_FINDINGS: FULL

## 2022-10-11 ASSESSMENT — TONOMETRY
OS_IOP_MMHG: 14
OD_IOP_MMHG: 12

## 2022-10-11 ASSESSMENT — VISUAL ACUITY
OD_BCVA: 20/20
OS_BCVA: 20/20

## 2022-10-11 ASSESSMENT — LACRIMAL DUCT - ASSESSMENT: OS_LACRIMAL_DUCT: NO PUNCTAL DISCHARGE

## 2022-10-11 ASSESSMENT — PACHYMETRY
OD_CT_UM: 553
OS_CT_CORRECTION: -1
OS_CT_UM: 566
OD_CT_CORRECTION: -1

## 2022-10-11 ASSESSMENT — LID EXAM ASSESSMENTS: OS_COMMENTS: NO FOREIGN BODY WITH LID EVERTED

## 2022-10-13 ENCOUNTER — OFFICE (OUTPATIENT)
Dept: URBAN - METROPOLITAN AREA CLINIC 27 | Facility: CLINIC | Age: 37
Setting detail: OPHTHALMOLOGY
End: 2022-10-13
Payer: COMMERCIAL

## 2022-10-13 DIAGNOSIS — H10.45: ICD-10-CM

## 2022-10-13 DIAGNOSIS — H16.203: ICD-10-CM

## 2022-10-13 DIAGNOSIS — H16.223: ICD-10-CM

## 2022-10-13 PROBLEM — H40.013: Status: ACTIVE | Noted: 2022-09-08

## 2022-10-13 PROBLEM — H43.393 VITREOUS FLOATERS; BOTH EYES: Status: ACTIVE | Noted: 2022-10-11

## 2022-10-13 PROCEDURE — 92012 INTRM OPH EXAM EST PATIENT: CPT | Performed by: OPHTHALMOLOGY

## 2022-10-13 ASSESSMENT — SUPERFICIAL PUNCTATE KERATITIS (SPK)
OD_SPK: 1+
OS_SPK: 1+ 2+

## 2022-10-13 ASSESSMENT — KERATOMETRY
OD_AXISANGLE_DEGREES: 083
OS_K1POWER_DIOPTERS: 41.25
OS_AXISANGLE_DEGREES: 093
OS_K2POWER_DIOPTERS: 42.50
OD_K2POWER_DIOPTERS: 42.00
OD_K1POWER_DIOPTERS: 41.25

## 2022-10-13 ASSESSMENT — SPHEQUIV_DERIVED
OS_SPHEQUIV: -0.125
OD_SPHEQUIV: 0.375

## 2022-10-13 ASSESSMENT — REFRACTION_AUTOREFRACTION
OS_SPHERE: -0.25
OS_CYLINDER: +0.25
OD_SPHERE: +0.25
OS_AXIS: 093
OD_AXIS: 89
OD_CYLINDER: +0.25

## 2022-10-13 ASSESSMENT — LID EXAM ASSESSMENTS: OS_COMMENTS: NO FOREIGN BODY WITH LID EVERTED

## 2022-10-13 ASSESSMENT — PACHYMETRY
OS_CT_UM: 566
OD_CT_CORRECTION: -1
OS_CT_CORRECTION: -1
OD_CT_UM: 553

## 2022-10-13 ASSESSMENT — VISUAL ACUITY
OS_BCVA: 20/20
OD_BCVA: 20/20

## 2022-10-13 ASSESSMENT — AXIALLENGTH_DERIVED
OD_AL: 24.1473
OS_AL: 24.2556

## 2022-10-13 ASSESSMENT — TONOMETRY
OD_IOP_MMHG: 15
OS_IOP_MMHG: 15

## 2022-10-13 ASSESSMENT — LACRIMAL DUCT - ASSESSMENT: OS_LACRIMAL_DUCT: NO PUNCTAL DISCHARGE

## 2022-12-28 ENCOUNTER — OFFICE (OUTPATIENT)
Dept: URBAN - METROPOLITAN AREA CLINIC 109 | Facility: CLINIC | Age: 37
Setting detail: OPHTHALMOLOGY
End: 2022-12-28
Payer: COMMERCIAL

## 2022-12-28 DIAGNOSIS — H10.45: ICD-10-CM

## 2022-12-28 DIAGNOSIS — H00.025: ICD-10-CM

## 2022-12-28 DIAGNOSIS — H16.142: ICD-10-CM

## 2022-12-28 DIAGNOSIS — H16.042: ICD-10-CM

## 2022-12-28 DIAGNOSIS — H00.022: ICD-10-CM

## 2022-12-28 DIAGNOSIS — H16.221: ICD-10-CM

## 2022-12-28 PROCEDURE — 99213 OFFICE O/P EST LOW 20 MIN: CPT | Performed by: OPHTHALMOLOGY

## 2022-12-28 ASSESSMENT — PACHYMETRY
OD_CT_UM: 553
OS_CT_UM: 566
OD_CT_CORRECTION: -1
OS_CT_CORRECTION: -1

## 2022-12-28 ASSESSMENT — CONFRONTATIONAL VISUAL FIELD TEST (CVF)
OS_FINDINGS: FULL
OD_FINDINGS: FULL

## 2022-12-28 ASSESSMENT — TONOMETRY
OS_IOP_MMHG: 13
OD_IOP_MMHG: 13

## 2022-12-28 ASSESSMENT — LID EXAM ASSESSMENTS
OD_MEIBOMITIS: RLL 1+
OS_MEIBOMITIS: LLL 1+
OD_COMMENTS: M1MT0

## 2022-12-28 ASSESSMENT — VISUAL ACUITY
OD_BCVA: 20/20
OS_BCVA: 20/20

## 2022-12-28 ASSESSMENT — LACRIMAL DUCT - ASSESSMENT: OS_LACRIMAL_DUCT: NO PUNCTAL DISCHARGE

## 2023-03-05 ENCOUNTER — EMERGENCY (EMERGENCY)
Facility: HOSPITAL | Age: 38
LOS: 1 days | Discharge: HOME | End: 2023-03-05
Attending: STUDENT IN AN ORGANIZED HEALTH CARE EDUCATION/TRAINING PROGRAM | Admitting: STUDENT IN AN ORGANIZED HEALTH CARE EDUCATION/TRAINING PROGRAM
Payer: COMMERCIAL

## 2023-03-05 VITALS
DIASTOLIC BLOOD PRESSURE: 85 MMHG | RESPIRATION RATE: 18 BRPM | HEART RATE: 108 BPM | OXYGEN SATURATION: 98 % | SYSTOLIC BLOOD PRESSURE: 122 MMHG | TEMPERATURE: 99 F | HEIGHT: 66 IN | WEIGHT: 134.92 LBS

## 2023-03-05 VITALS
TEMPERATURE: 99 F | HEART RATE: 74 BPM | SYSTOLIC BLOOD PRESSURE: 115 MMHG | RESPIRATION RATE: 18 BRPM | OXYGEN SATURATION: 99 % | DIASTOLIC BLOOD PRESSURE: 66 MMHG

## 2023-03-05 DIAGNOSIS — Z87.19 PERSONAL HISTORY OF OTHER DISEASES OF THE DIGESTIVE SYSTEM: Chronic | ICD-10-CM

## 2023-03-05 DIAGNOSIS — Z90.49 ACQUIRED ABSENCE OF OTHER SPECIFIED PARTS OF DIGESTIVE TRACT: Chronic | ICD-10-CM

## 2023-03-05 LAB
ALBUMIN SERPL ELPH-MCNC: 3.4 G/DL — SIGNIFICANT CHANGE UP (ref 3.3–5)
ALP SERPL-CCNC: 64 U/L — SIGNIFICANT CHANGE UP (ref 40–120)
ALT FLD-CCNC: 19 U/L — SIGNIFICANT CHANGE UP (ref 12–78)
ANION GAP SERPL CALC-SCNC: 5 MMOL/L — SIGNIFICANT CHANGE UP (ref 5–17)
APTT BLD: 30.3 SEC — SIGNIFICANT CHANGE UP (ref 27.5–35.5)
AST SERPL-CCNC: 17 U/L — SIGNIFICANT CHANGE UP (ref 15–37)
BASOPHILS # BLD AUTO: 0.02 K/UL — SIGNIFICANT CHANGE UP (ref 0–0.2)
BASOPHILS NFR BLD AUTO: 0.4 % — SIGNIFICANT CHANGE UP (ref 0–2)
BILIRUB SERPL-MCNC: 0.3 MG/DL — SIGNIFICANT CHANGE UP (ref 0.2–1.2)
BUN SERPL-MCNC: 19 MG/DL — SIGNIFICANT CHANGE UP (ref 7–23)
CALCIUM SERPL-MCNC: 9.7 MG/DL — SIGNIFICANT CHANGE UP (ref 8.5–10.1)
CHLORIDE SERPL-SCNC: 103 MMOL/L — SIGNIFICANT CHANGE UP (ref 96–108)
CO2 SERPL-SCNC: 28 MMOL/L — SIGNIFICANT CHANGE UP (ref 22–31)
CREAT SERPL-MCNC: 0.66 MG/DL — SIGNIFICANT CHANGE UP (ref 0.5–1.3)
D DIMER BLD IA.RAPID-MCNC: 236 NG/ML DDU — HIGH
EGFR: 116 ML/MIN/1.73M2 — SIGNIFICANT CHANGE UP
EOSINOPHIL # BLD AUTO: 0.03 K/UL — SIGNIFICANT CHANGE UP (ref 0–0.5)
EOSINOPHIL NFR BLD AUTO: 0.6 % — SIGNIFICANT CHANGE UP (ref 0–6)
FLUAV AG NPH QL: SIGNIFICANT CHANGE UP
FLUBV AG NPH QL: SIGNIFICANT CHANGE UP
GLUCOSE SERPL-MCNC: 86 MG/DL — SIGNIFICANT CHANGE UP (ref 70–99)
HCG SERPL-ACNC: <1 MIU/ML — SIGNIFICANT CHANGE UP
HCT VFR BLD CALC: 42.4 % — SIGNIFICANT CHANGE UP (ref 34.5–45)
HGB BLD-MCNC: 13.7 G/DL — SIGNIFICANT CHANGE UP (ref 11.5–15.5)
IMM GRANULOCYTES NFR BLD AUTO: 0.2 % — SIGNIFICANT CHANGE UP (ref 0–0.9)
INR BLD: 0.97 RATIO — SIGNIFICANT CHANGE UP (ref 0.88–1.16)
LYMPHOCYTES # BLD AUTO: 2.08 K/UL — SIGNIFICANT CHANGE UP (ref 1–3.3)
LYMPHOCYTES # BLD AUTO: 39.8 % — SIGNIFICANT CHANGE UP (ref 13–44)
MCHC RBC-ENTMCNC: 30.6 PG — SIGNIFICANT CHANGE UP (ref 27–34)
MCHC RBC-ENTMCNC: 32.3 GM/DL — SIGNIFICANT CHANGE UP (ref 32–36)
MCV RBC AUTO: 94.6 FL — SIGNIFICANT CHANGE UP (ref 80–100)
MONOCYTES # BLD AUTO: 0.29 K/UL — SIGNIFICANT CHANGE UP (ref 0–0.9)
MONOCYTES NFR BLD AUTO: 5.5 % — SIGNIFICANT CHANGE UP (ref 2–14)
NEUTROPHILS # BLD AUTO: 2.8 K/UL — SIGNIFICANT CHANGE UP (ref 1.8–7.4)
NEUTROPHILS NFR BLD AUTO: 53.5 % — SIGNIFICANT CHANGE UP (ref 43–77)
NRBC # BLD: 0 /100 WBCS — SIGNIFICANT CHANGE UP (ref 0–0)
NT-PROBNP SERPL-SCNC: 16 PG/ML — SIGNIFICANT CHANGE UP (ref 0–125)
PLATELET # BLD AUTO: 269 K/UL — SIGNIFICANT CHANGE UP (ref 150–400)
POTASSIUM SERPL-MCNC: 4.3 MMOL/L — SIGNIFICANT CHANGE UP (ref 3.5–5.3)
POTASSIUM SERPL-SCNC: 4.3 MMOL/L — SIGNIFICANT CHANGE UP (ref 3.5–5.3)
PROT SERPL-MCNC: 8.3 G/DL — SIGNIFICANT CHANGE UP (ref 6–8.3)
PROTHROM AB SERPL-ACNC: 11.4 SEC — SIGNIFICANT CHANGE UP (ref 10.5–13.4)
RBC # BLD: 4.48 M/UL — SIGNIFICANT CHANGE UP (ref 3.8–5.2)
RBC # FLD: 11.8 % — SIGNIFICANT CHANGE UP (ref 10.3–14.5)
RSV RNA NPH QL NAA+NON-PROBE: SIGNIFICANT CHANGE UP
SARS-COV-2 RNA SPEC QL NAA+PROBE: DETECTED
SODIUM SERPL-SCNC: 136 MMOL/L — SIGNIFICANT CHANGE UP (ref 135–145)
TROPONIN I, HIGH SENSITIVITY RESULT: <3 NG/L — SIGNIFICANT CHANGE UP
TSH SERPL-MCNC: 1.46 UIU/ML — SIGNIFICANT CHANGE UP (ref 0.36–3.74)
WBC # BLD: 5.23 K/UL — SIGNIFICANT CHANGE UP (ref 3.8–10.5)
WBC # FLD AUTO: 5.23 K/UL — SIGNIFICANT CHANGE UP (ref 3.8–10.5)

## 2023-03-05 PROCEDURE — 84443 ASSAY THYROID STIM HORMONE: CPT

## 2023-03-05 PROCEDURE — 80053 COMPREHEN METABOLIC PANEL: CPT

## 2023-03-05 PROCEDURE — 83880 ASSAY OF NATRIURETIC PEPTIDE: CPT

## 2023-03-05 PROCEDURE — 99285 EMERGENCY DEPT VISIT HI MDM: CPT

## 2023-03-05 PROCEDURE — 71275 CT ANGIOGRAPHY CHEST: CPT | Mod: 26,MA

## 2023-03-05 PROCEDURE — 84702 CHORIONIC GONADOTROPIN TEST: CPT

## 2023-03-05 PROCEDURE — 85025 COMPLETE CBC W/AUTO DIFF WBC: CPT

## 2023-03-05 PROCEDURE — 36415 COLL VENOUS BLD VENIPUNCTURE: CPT

## 2023-03-05 PROCEDURE — 93005 ELECTROCARDIOGRAM TRACING: CPT

## 2023-03-05 PROCEDURE — 93971 EXTREMITY STUDY: CPT

## 2023-03-05 PROCEDURE — 71275 CT ANGIOGRAPHY CHEST: CPT | Mod: MA

## 2023-03-05 PROCEDURE — 85610 PROTHROMBIN TIME: CPT

## 2023-03-05 PROCEDURE — 87637 SARSCOV2&INF A&B&RSV AMP PRB: CPT

## 2023-03-05 PROCEDURE — 93971 EXTREMITY STUDY: CPT | Mod: 26,LT

## 2023-03-05 PROCEDURE — 85730 THROMBOPLASTIN TIME PARTIAL: CPT

## 2023-03-05 PROCEDURE — 99285 EMERGENCY DEPT VISIT HI MDM: CPT | Mod: 25

## 2023-03-05 PROCEDURE — 85379 FIBRIN DEGRADATION QUANT: CPT

## 2023-03-05 PROCEDURE — 93010 ELECTROCARDIOGRAM REPORT: CPT

## 2023-03-05 PROCEDURE — 84484 ASSAY OF TROPONIN QUANT: CPT

## 2023-03-05 NOTE — ED ADULT NURSE REASSESSMENT NOTE - NS ED NURSE REASSESS COMMENT FT1
1303:  patient is d/c.  iv removed, site benign.  the patient was given all d/c papers and instructed to f/u with her pmd. if her symptoms persist or worsen, it was recommended that she f/u with her pmd or return to the er.  the patient states she does not need work note.  vitals recorded.  she ambulated out of the ed in stable condition, no distress and with all of her belongings.  all appropriate paperwork and results given to the patient.  montse duran.

## 2023-03-05 NOTE — ED ADULT NURSE NOTE - OBJECTIVE STATEMENT
the patient presents to the er.  she states that 1.5 weeks ago, she had covid.  she was resting over the past couple of days and has been checking her pulse ox, which was always okay.  Yesterday, she noted her HR was in the 120's.  This am, it went up to the 140's.  She denies any history of fever.  She states that she then developed some popliteal tenderness / ache and posterior left thigh discomfort and became concerned that she can possibly have a clot.  she denies any sob, but states she sometimes gets winded upon exertion.  she is alert / oriented x4.  no difficulty speaking, able to carry on conversation.  slight cough noted.  she is alert / oriented x4. iv access obtained, labs collected / swab collected and sent

## 2023-03-05 NOTE — ED PROVIDER NOTE - PHYSICAL EXAMINATION
Vital signs as available reviewed.  General:  No acute distress.  Head:  Normocephalic, atraumatic.  Eyes:  Conjunctiva pink, no icterus.  Cardiovascular:  Regular rate, no obvious murmur.  Respiratory:  Clear to auscultation, good air entry bilaterally.  Abdomen:  Soft, non-tender.  Musculoskeletal:  No obvious deformity. left lower extremity: no calf tenderness, normal color, no significant swelling, normal range of motion.  Neurologic: Alert and oriented, moving all extremities.  Skin:  Warm and dry.

## 2023-03-05 NOTE — ED PROVIDER NOTE - EKG #1 DATE/TIME
Physician Progress Note      PATIENTTabrenden Sanchez  CSN #:                  [id number]  :                       1979  ADMIT DATE:       2022 7:38 AM  DISCH DATE:        2022 4:21 PM  RESPONDING  PROVIDER #:        Declan Robertson CNP          QUERY TEXT:    Pt admitted for loop colostomy reversal.  Pt noted to have hyponatremia on   undated problem list with Na level of 129-132. If possible, please document in the progress notes status of hyponatremia    The medical record reflects the following:  Risk Factors: DM, htn, insulin pump in place, kidney disease  Clinical Indicators: Na 130>132>129>131  Treatment: IVF, daily lab monitoring    Thank you please contact me for any questions! Jen Grey RN, CCDS, CDI   Supervisor [phone]  Options provided:  -- Hyponatremia present on admission  -- Clinically insignificant low serum sodium with history of hyponatremia  -- Other - I will add my own diagnosis  -- Disagree - Not applicable / Not valid  -- Disagree - Clinically unable to determine / Unknown  -- Refer to Clinical Documentation Reviewer    PROVIDER RESPONSE TEXT:    This patient has hyponatremia present on admission.     Query created by: Flor Del Real on 2022 1:26 PM      Electronically signed by:  Declan Robertson CNP 2022 9:08 AM 05-Mar-2023 10:58

## 2023-03-05 NOTE — ED ADULT TRIAGE NOTE - CHIEF COMPLAINT QUOTE
Patient walked in with c/o high heart rate 120s-140s, also with left leg posterior knee and posterior thigh pain. Patient reports she feels "winded" when she goes up and down the stairs. Tested positive for covid last Saturday.  Patient takes OCPs for birth control. Denies other significant PMhx.

## 2023-03-05 NOTE — ED PROVIDER NOTE - PATIENT PORTAL LINK FT
You can access the FollowMyHealth Patient Portal offered by [facility] by registering at the following website: [email or web address]. By joining [employer]’s FollowMyHealth portal, you will also be able to view your health information using other applications (apps) compatible with our system.

## 2023-03-05 NOTE — ED ADULT NURSE NOTE - NSIMPLEMENTINTERV_GEN_ALL_ED
Implemented All Universal Safety Interventions:  [place] to call system. Call bell, personal items and telephone within reach. Instruct patient to call for assistance. Room bathroom lighting operational. Non-slip footwear when patient is off stretcher. Physically safe environment: no spills, clutter or unnecessary equipment. Stretcher in lowest position, wheels locked, appropriate side rails in place.

## 2023-03-05 NOTE — ED PROVIDER NOTE - CLINICAL SUMMARY MEDICAL DECISION MAKING FREE TEXT BOX
here with atraumatic leg pain, shortness of breath, recent covid, on ocps. check labs, us/CT, hydrate, monitor. here with atraumatic leg pain, shortness of breath, recent covid, on ocps. check labs, us/CT, hydrate, monitor.    work up un-revealing except patient still covid+. patient aware of results. will discharge.

## 2023-03-05 NOTE — ED PROVIDER NOTE - CARE PLAN
Principal Discharge DX:	2019 novel coronavirus disease (COVID-19)  Secondary Diagnosis:	Tachycardia  Secondary Diagnosis:	Leg pain   1

## 2023-03-05 NOTE — ED PROVIDER NOTE - OBJECTIVE STATEMENT
37-year-old female no reported medical history, has PCOS per EMR here complaining of palpitations, left leg pain, shortness of breath.  Patient reports about a week ago she had COVID and has not been feeling that bad.  Patient reports for the past few days she has been more tired and has been in bed.  She is checking her pulse ox at home and has noted that her pulse rate was in the 140s. Leg pain in left leg behind knee and in thigh, worse with standing, no trauma.  No chest pain, no fevers, no cough, no nausea vomiting or stomach pain.  Patient taking OCPs.  PMD Dr. Connolly (sp?)

## 2023-04-15 NOTE — ED PROVIDER NOTE - CLINICAL SUMMARY MEDICAL DECISION MAKING FREE TEXT BOX
left upper dental pain with left sided facial swelling. concerns for dental infection vs abscess. will check labs, IV abx (no improvement on oral Augmentin, will consider changing to clindamycin), CT to eval for abscess
Audio/Video

## 2023-05-23 NOTE — ED ADULT NURSE NOTE - NS ED NURSE RECORD ANOTHER HT AND WT
Still unable to get access. Total of X11 sticks. APRN aware.       Maximo Ervin RN  05/23/23 6766 Yes

## 2023-07-23 ENCOUNTER — EMERGENCY (EMERGENCY)
Facility: HOSPITAL | Age: 38
LOS: 1 days | Discharge: HOME | End: 2023-07-23
Attending: STUDENT IN AN ORGANIZED HEALTH CARE EDUCATION/TRAINING PROGRAM | Admitting: STUDENT IN AN ORGANIZED HEALTH CARE EDUCATION/TRAINING PROGRAM
Payer: COMMERCIAL

## 2023-07-23 VITALS
WEIGHT: 134.92 LBS | SYSTOLIC BLOOD PRESSURE: 125 MMHG | HEIGHT: 66 IN | RESPIRATION RATE: 18 BRPM | OXYGEN SATURATION: 100 % | HEART RATE: 75 BPM | DIASTOLIC BLOOD PRESSURE: 81 MMHG | TEMPERATURE: 99 F

## 2023-07-23 VITALS
OXYGEN SATURATION: 99 % | RESPIRATION RATE: 18 BRPM | HEART RATE: 72 BPM | SYSTOLIC BLOOD PRESSURE: 120 MMHG | DIASTOLIC BLOOD PRESSURE: 80 MMHG | TEMPERATURE: 98 F

## 2023-07-23 DIAGNOSIS — Z87.19 PERSONAL HISTORY OF OTHER DISEASES OF THE DIGESTIVE SYSTEM: Chronic | ICD-10-CM

## 2023-07-23 DIAGNOSIS — Z90.49 ACQUIRED ABSENCE OF OTHER SPECIFIED PARTS OF DIGESTIVE TRACT: Chronic | ICD-10-CM

## 2023-07-23 LAB
ALBUMIN SERPL ELPH-MCNC: 3.5 G/DL — SIGNIFICANT CHANGE UP (ref 3.3–5)
ALP SERPL-CCNC: 55 U/L — SIGNIFICANT CHANGE UP (ref 40–120)
ALT FLD-CCNC: 20 U/L — SIGNIFICANT CHANGE UP (ref 12–78)
ANION GAP SERPL CALC-SCNC: 4 MMOL/L — LOW (ref 5–17)
AST SERPL-CCNC: 14 U/L — LOW (ref 15–37)
BASOPHILS # BLD AUTO: 0.02 K/UL — SIGNIFICANT CHANGE UP (ref 0–0.2)
BASOPHILS NFR BLD AUTO: 0.3 % — SIGNIFICANT CHANGE UP (ref 0–2)
BILIRUB SERPL-MCNC: 0.2 MG/DL — SIGNIFICANT CHANGE UP (ref 0.2–1.2)
BUN SERPL-MCNC: 14 MG/DL — SIGNIFICANT CHANGE UP (ref 7–23)
CALCIUM SERPL-MCNC: 9.2 MG/DL — SIGNIFICANT CHANGE UP (ref 8.5–10.1)
CHLORIDE SERPL-SCNC: 108 MMOL/L — SIGNIFICANT CHANGE UP (ref 96–108)
CO2 SERPL-SCNC: 27 MMOL/L — SIGNIFICANT CHANGE UP (ref 22–31)
CREAT SERPL-MCNC: 0.5 MG/DL — SIGNIFICANT CHANGE UP (ref 0.5–1.3)
D DIMER BLD IA.RAPID-MCNC: <150 NG/ML DDU — SIGNIFICANT CHANGE UP
EGFR: 124 ML/MIN/1.73M2 — SIGNIFICANT CHANGE UP
EOSINOPHIL # BLD AUTO: 0.05 K/UL — SIGNIFICANT CHANGE UP (ref 0–0.5)
EOSINOPHIL NFR BLD AUTO: 0.6 % — SIGNIFICANT CHANGE UP (ref 0–6)
GLUCOSE SERPL-MCNC: 104 MG/DL — HIGH (ref 70–99)
HCG SERPL-ACNC: <1 MIU/ML — SIGNIFICANT CHANGE UP
HCT VFR BLD CALC: 39.6 % — SIGNIFICANT CHANGE UP (ref 34.5–45)
HGB BLD-MCNC: 12.7 G/DL — SIGNIFICANT CHANGE UP (ref 11.5–15.5)
IMM GRANULOCYTES NFR BLD AUTO: 0.3 % — SIGNIFICANT CHANGE UP (ref 0–0.9)
LYMPHOCYTES # BLD AUTO: 3.22 K/UL — SIGNIFICANT CHANGE UP (ref 1–3.3)
LYMPHOCYTES # BLD AUTO: 41.7 % — SIGNIFICANT CHANGE UP (ref 13–44)
MCHC RBC-ENTMCNC: 31.4 PG — SIGNIFICANT CHANGE UP (ref 27–34)
MCHC RBC-ENTMCNC: 32.1 GM/DL — SIGNIFICANT CHANGE UP (ref 32–36)
MCV RBC AUTO: 98 FL — SIGNIFICANT CHANGE UP (ref 80–100)
MONOCYTES # BLD AUTO: 0.54 K/UL — SIGNIFICANT CHANGE UP (ref 0–0.9)
MONOCYTES NFR BLD AUTO: 7 % — SIGNIFICANT CHANGE UP (ref 2–14)
NEUTROPHILS # BLD AUTO: 3.88 K/UL — SIGNIFICANT CHANGE UP (ref 1.8–7.4)
NEUTROPHILS NFR BLD AUTO: 50.1 % — SIGNIFICANT CHANGE UP (ref 43–77)
NRBC # BLD: 0 /100 WBCS — SIGNIFICANT CHANGE UP (ref 0–0)
PLATELET # BLD AUTO: 240 K/UL — SIGNIFICANT CHANGE UP (ref 150–400)
POTASSIUM SERPL-MCNC: 4.4 MMOL/L — SIGNIFICANT CHANGE UP (ref 3.5–5.3)
POTASSIUM SERPL-SCNC: 4.4 MMOL/L — SIGNIFICANT CHANGE UP (ref 3.5–5.3)
PROT SERPL-MCNC: 7.4 G/DL — SIGNIFICANT CHANGE UP (ref 6–8.3)
RBC # BLD: 4.04 M/UL — SIGNIFICANT CHANGE UP (ref 3.8–5.2)
RBC # FLD: 12 % — SIGNIFICANT CHANGE UP (ref 10.3–14.5)
SODIUM SERPL-SCNC: 139 MMOL/L — SIGNIFICANT CHANGE UP (ref 135–145)
TROPONIN I, HIGH SENSITIVITY RESULT: <3 NG/L — SIGNIFICANT CHANGE UP
WBC # BLD: 7.73 K/UL — SIGNIFICANT CHANGE UP (ref 3.8–10.5)
WBC # FLD AUTO: 7.73 K/UL — SIGNIFICANT CHANGE UP (ref 3.8–10.5)

## 2023-07-23 PROCEDURE — 85379 FIBRIN DEGRADATION QUANT: CPT

## 2023-07-23 PROCEDURE — 85025 COMPLETE CBC W/AUTO DIFF WBC: CPT

## 2023-07-23 PROCEDURE — 93005 ELECTROCARDIOGRAM TRACING: CPT

## 2023-07-23 PROCEDURE — 84702 CHORIONIC GONADOTROPIN TEST: CPT

## 2023-07-23 PROCEDURE — 93010 ELECTROCARDIOGRAM REPORT: CPT

## 2023-07-23 PROCEDURE — 80053 COMPREHEN METABOLIC PANEL: CPT

## 2023-07-23 PROCEDURE — 71046 X-RAY EXAM CHEST 2 VIEWS: CPT | Mod: 26

## 2023-07-23 PROCEDURE — 71046 X-RAY EXAM CHEST 2 VIEWS: CPT

## 2023-07-23 PROCEDURE — 36415 COLL VENOUS BLD VENIPUNCTURE: CPT

## 2023-07-23 PROCEDURE — 99285 EMERGENCY DEPT VISIT HI MDM: CPT | Mod: 25

## 2023-07-23 PROCEDURE — 84484 ASSAY OF TROPONIN QUANT: CPT

## 2023-07-23 PROCEDURE — 99285 EMERGENCY DEPT VISIT HI MDM: CPT

## 2023-07-23 NOTE — ED PROVIDER NOTE - OBJECTIVE STATEMENT
37-year-old female with no significant past medical history is on oral contraceptive pills presents with intermittent chest pain for the last 2 days.  Pain initially started on the left side and now is on the right side.  Pain is worse with exertion as well as taking deep breaths.  Patient started having pain after she did some heavy lifting of water bottles but is concerned because of the pleuritic component of it.  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  Patient is concern for PE given that she is on OCPs.  Denies coughing or fevers or chills or leg swelling.  Does have family history of cardiac disease in her father.

## 2023-07-23 NOTE — ED PROVIDER NOTE - CLINICAL SUMMARY MEDICAL DECISION MAKING FREE TEXT BOX
37-year-old female with no significant past medical history is on oral contraceptive pills presents with intermittent chest pain for the last 2 days.  Pain initially started on the left side and now is on the right side.  Pain is worse with exertion as well as taking deep breaths.  Patient started having pain after she did some heavy lifting of water bottles but is concerned because of the pleuritic component of it.  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  Patient is concern for PE given that she is on OCPs.  Denies coughing or fevers or chills or leg swelling.  Does have family history of cardiac disease in her father.  Patient is well-appearing vitals within normal limits not PERC negative given OCPs we will check labs chest x-ray dimer and reassess if negative recommend for cardiology follow-up and treat for MSK pain

## 2023-07-23 NOTE — ED ADULT TRIAGE NOTE - NS ED TRIAGE AVPU SCALE
- - - Alert-The patient is alert, awake and responds to voice. The patient is oriented to time, place, and person. The triage nurse is able to obtain subjective information.

## 2023-07-23 NOTE — ED ADULT NURSE NOTE - OBJECTIVE STATEMENT
Pt c/o worsening CP and SOB for few days, pain worsening upon exertion. Denies n/v, dizziness, weakness. safety maintained, call bell within reach. Nursing care ongoing.

## 2023-07-23 NOTE — ED ADULT NURSE NOTE - NSFALLUNIVINTERV_ED_ALL_ED
Bed/Stretcher in lowest position, wheels locked, appropriate side rails in place/Call bell, personal items and telephone in reach/Instruct patient to call for assistance before getting out of bed/chair/stretcher/Non-slip footwear applied when patient is off stretcher/[place] to call system/Physically safe environment - no spills, clutter or unnecessary equipment/Purposeful proactive rounding/Room/bathroom lighting operational, light cord in reach

## 2023-07-23 NOTE — ED PROVIDER NOTE - CARE PROVIDER_API CALL
Jayleen Jung  Cardiology  [address] [place] [address]  Phone: [phone]  Fax: [phone]  Follow Up Time: 4-6 Days

## 2023-07-23 NOTE — ED PROVIDER NOTE - PHYSICAL EXAMINATION
Gen: Well appearing in NAD  Head: NC/AT  Neck: trachea midline  cv; rrr  Resp:  No distress, lungs clear  abdnontenderr  Ext: no deformities  Neuro:  A&O appears non focal  Skin:  Warm and dry as visualized  Psych:  Normal affect and mood

## 2023-07-23 NOTE — ED PROVIDER NOTE - PATIENT PORTAL LINK FT
You can access the FollowMyHealth Patient Portal offered by [facility] by registering at the following website: [email or web address]. By joining [employer]’s FollowMyHealth portal, you will also be able to view your health information using other applications (apps) compatible with our system.

## 2023-07-23 NOTE — ED PROVIDER NOTE - CARE PROVIDERS DIRECT ADDRESSES
[email or web address]

## 2023-07-23 NOTE — ED PROVIDER NOTE - NSFOLLOWUPINSTRUCTIONS_ED_ALL_ED_FT
1. TAKE ALL MEDICATIONS AS DIRECTED.    2. FOR PAIN OR FEVER YOU CAN TAKE ACETAMINOPHEN (TYLENOL) AS NEEDED, AS DIRECTED ON PACKAGING.  3. FOLLOW UP WITH YOUR PRIMARY DOCTOR WITHIN 5 DAYS AS DIRECTED.  4. IF YOU HAD LABS OR IMAGING DONE, YOU WERE GIVEN COPIES OF ALL LABS AND/OR IMAGING RESULTS FROM YOUR ER VISIT--PLEASE TAKE THEM WITH YOU TO YOUR FOLLOW UP APPOINTMENTS.  5. IF NEEDED, CALL PATIENT ACCESS SERVICES AT [phone] TO FIND A PRIMARY CARE PHYSICIAN.  OR CALL [phone] TO MAKE AN APPOINTMENT WITH THE CLINIC.  6. RETURN TO THE ER FOR ANY WORSENING SYMPTOMS OR CONCERNS.    Chest Pain    Chest pain can be caused by many different conditions which may or may not be dangerous. Causes include heartburn, lung infections, heart attack, blood clot in lungs, skin infections, strain or damage to muscle, cartilage, or bones, etc. In addition to a history and physical examination, an electrocardiogram (ECG) or other lab tests may have been performed to determine the cause of your chest pain. Follow up with your primary care provider or with a cardiologist as instructed.     SEEK IMMEDIATE MEDICAL CARE IF YOU HAVE ANY OF THE FOLLOWING SYMPTOMS: worsening chest pain, coughing up blood, unexplained back/neck/jaw pain, severe abdominal pain, dizziness or lightheadedness, fainting, shortness of breath, sweaty or clammy skin, vomiting, or racing heart beat. These symptoms may represent a serious problem that is an emergency. Do not wait to see if the symptoms will go away. Get medical help right away. Call [phone] and do not drive yourself to the hospital.

## 2023-08-08 ENCOUNTER — EMERGENCY (EMERGENCY)
Facility: HOSPITAL | Age: 38
LOS: 1 days | Discharge: HOME | End: 2023-08-08
Attending: STUDENT IN AN ORGANIZED HEALTH CARE EDUCATION/TRAINING PROGRAM | Admitting: STUDENT IN AN ORGANIZED HEALTH CARE EDUCATION/TRAINING PROGRAM
Payer: COMMERCIAL

## 2023-08-08 VITALS
SYSTOLIC BLOOD PRESSURE: 118 MMHG | RESPIRATION RATE: 16 BRPM | DIASTOLIC BLOOD PRESSURE: 78 MMHG | TEMPERATURE: 99 F | HEART RATE: 91 BPM | OXYGEN SATURATION: 99 %

## 2023-08-08 VITALS
WEIGHT: 134.92 LBS | TEMPERATURE: 99 F | HEIGHT: 66 IN | RESPIRATION RATE: 20 BRPM | OXYGEN SATURATION: 99 % | HEART RATE: 96 BPM | SYSTOLIC BLOOD PRESSURE: 122 MMHG | DIASTOLIC BLOOD PRESSURE: 84 MMHG

## 2023-08-08 DIAGNOSIS — Z90.49 ACQUIRED ABSENCE OF OTHER SPECIFIED PARTS OF DIGESTIVE TRACT: Chronic | ICD-10-CM

## 2023-08-08 DIAGNOSIS — Z87.19 PERSONAL HISTORY OF OTHER DISEASES OF THE DIGESTIVE SYSTEM: Chronic | ICD-10-CM

## 2023-08-08 LAB
ALBUMIN SERPL ELPH-MCNC: 3.6 G/DL — SIGNIFICANT CHANGE UP (ref 3.3–5)
ALP SERPL-CCNC: 53 U/L — SIGNIFICANT CHANGE UP (ref 40–120)
ALT FLD-CCNC: 22 U/L — SIGNIFICANT CHANGE UP (ref 12–78)
ANION GAP SERPL CALC-SCNC: 6 MMOL/L — SIGNIFICANT CHANGE UP (ref 5–17)
AST SERPL-CCNC: 17 U/L — SIGNIFICANT CHANGE UP (ref 15–37)
BASOPHILS # BLD AUTO: 0.03 K/UL — SIGNIFICANT CHANGE UP (ref 0–0.2)
BASOPHILS NFR BLD AUTO: 0.6 % — SIGNIFICANT CHANGE UP (ref 0–2)
BILIRUB SERPL-MCNC: 0.6 MG/DL — SIGNIFICANT CHANGE UP (ref 0.2–1.2)
BUN SERPL-MCNC: 14 MG/DL — SIGNIFICANT CHANGE UP (ref 7–23)
CALCIUM SERPL-MCNC: 9.4 MG/DL — SIGNIFICANT CHANGE UP (ref 8.5–10.1)
CHLORIDE SERPL-SCNC: 105 MMOL/L — SIGNIFICANT CHANGE UP (ref 96–108)
CO2 SERPL-SCNC: 28 MMOL/L — SIGNIFICANT CHANGE UP (ref 22–31)
CREAT SERPL-MCNC: 0.66 MG/DL — SIGNIFICANT CHANGE UP (ref 0.5–1.3)
EGFR: 115 ML/MIN/1.73M2 — SIGNIFICANT CHANGE UP
EOSINOPHIL # BLD AUTO: 0.04 K/UL — SIGNIFICANT CHANGE UP (ref 0–0.5)
EOSINOPHIL NFR BLD AUTO: 0.7 % — SIGNIFICANT CHANGE UP (ref 0–6)
GLUCOSE SERPL-MCNC: 87 MG/DL — SIGNIFICANT CHANGE UP (ref 70–99)
HCG SERPL-ACNC: <1 MIU/ML — SIGNIFICANT CHANGE UP
HCT VFR BLD CALC: 40.7 % — SIGNIFICANT CHANGE UP (ref 34.5–45)
HGB BLD-MCNC: 13.5 G/DL — SIGNIFICANT CHANGE UP (ref 11.5–15.5)
IMM GRANULOCYTES NFR BLD AUTO: 0.2 % — SIGNIFICANT CHANGE UP (ref 0–0.9)
LIDOCAIN IGE QN: 139 U/L — SIGNIFICANT CHANGE UP (ref 73–393)
LYMPHOCYTES # BLD AUTO: 1.97 K/UL — SIGNIFICANT CHANGE UP (ref 1–3.3)
LYMPHOCYTES # BLD AUTO: 36.6 % — SIGNIFICANT CHANGE UP (ref 13–44)
MCHC RBC-ENTMCNC: 32.1 PG — SIGNIFICANT CHANGE UP (ref 27–34)
MCHC RBC-ENTMCNC: 33.2 GM/DL — SIGNIFICANT CHANGE UP (ref 32–36)
MCV RBC AUTO: 96.7 FL — SIGNIFICANT CHANGE UP (ref 80–100)
MONOCYTES # BLD AUTO: 0.36 K/UL — SIGNIFICANT CHANGE UP (ref 0–0.9)
MONOCYTES NFR BLD AUTO: 6.7 % — SIGNIFICANT CHANGE UP (ref 2–14)
NEUTROPHILS # BLD AUTO: 2.97 K/UL — SIGNIFICANT CHANGE UP (ref 1.8–7.4)
NEUTROPHILS NFR BLD AUTO: 55.2 % — SIGNIFICANT CHANGE UP (ref 43–77)
NRBC # BLD: 0 /100 WBCS — SIGNIFICANT CHANGE UP (ref 0–0)
PLATELET # BLD AUTO: 203 K/UL — SIGNIFICANT CHANGE UP (ref 150–400)
POTASSIUM SERPL-MCNC: 4.5 MMOL/L — SIGNIFICANT CHANGE UP (ref 3.5–5.3)
POTASSIUM SERPL-SCNC: 4.5 MMOL/L — SIGNIFICANT CHANGE UP (ref 3.5–5.3)
PROT SERPL-MCNC: 7.6 G/DL — SIGNIFICANT CHANGE UP (ref 6–8.3)
RBC # BLD: 4.21 M/UL — SIGNIFICANT CHANGE UP (ref 3.8–5.2)
RBC # FLD: 11.9 % — SIGNIFICANT CHANGE UP (ref 10.3–14.5)
SODIUM SERPL-SCNC: 139 MMOL/L — SIGNIFICANT CHANGE UP (ref 135–145)
TROPONIN I, HIGH SENSITIVITY RESULT: <3 NG/L — SIGNIFICANT CHANGE UP
WBC # BLD: 5.38 K/UL — SIGNIFICANT CHANGE UP (ref 3.8–10.5)
WBC # FLD AUTO: 5.38 K/UL — SIGNIFICANT CHANGE UP (ref 3.8–10.5)

## 2023-08-08 PROCEDURE — 93005 ELECTROCARDIOGRAM TRACING: CPT

## 2023-08-08 PROCEDURE — 83690 ASSAY OF LIPASE: CPT

## 2023-08-08 PROCEDURE — 84484 ASSAY OF TROPONIN QUANT: CPT

## 2023-08-08 PROCEDURE — 85025 COMPLETE CBC W/AUTO DIFF WBC: CPT

## 2023-08-08 PROCEDURE — 74177 CT ABD & PELVIS W/CONTRAST: CPT | Mod: 26,MA

## 2023-08-08 PROCEDURE — 84702 CHORIONIC GONADOTROPIN TEST: CPT

## 2023-08-08 PROCEDURE — 36415 COLL VENOUS BLD VENIPUNCTURE: CPT

## 2023-08-08 PROCEDURE — 80053 COMPREHEN METABOLIC PANEL: CPT

## 2023-08-08 PROCEDURE — 76705 ECHO EXAM OF ABDOMEN: CPT

## 2023-08-08 PROCEDURE — 99285 EMERGENCY DEPT VISIT HI MDM: CPT | Mod: 25

## 2023-08-08 PROCEDURE — 74177 CT ABD & PELVIS W/CONTRAST: CPT | Mod: MA

## 2023-08-08 PROCEDURE — 93010 ELECTROCARDIOGRAM REPORT: CPT

## 2023-08-08 PROCEDURE — 96374 THER/PROPH/DIAG INJ IV PUSH: CPT | Mod: XU

## 2023-08-08 PROCEDURE — 76705 ECHO EXAM OF ABDOMEN: CPT | Mod: 26

## 2023-08-08 PROCEDURE — 99285 EMERGENCY DEPT VISIT HI MDM: CPT

## 2023-08-08 PROCEDURE — 96375 TX/PRO/DX INJ NEW DRUG ADDON: CPT

## 2023-08-08 RX ORDER — ACETAMINOPHEN 500 MG
1000 TABLET ORAL ONCE
Refills: 0 | Status: COMPLETED | OUTPATIENT
Start: 2023-08-08 | End: 2023-08-08

## 2023-08-08 RX ORDER — SODIUM CHLORIDE 9 MG/ML
1000 INJECTION INTRAMUSCULAR; INTRAVENOUS; SUBCUTANEOUS ONCE
Refills: 0 | Status: COMPLETED | OUTPATIENT
Start: 2023-08-08 | End: 2023-08-08

## 2023-08-08 RX ORDER — IOHEXOL 300 MG/ML
30 INJECTION, SOLUTION INTRAVENOUS ONCE
Refills: 0 | Status: COMPLETED | OUTPATIENT
Start: 2023-08-08 | End: 2023-08-08

## 2023-08-08 RX ORDER — PANTOPRAZOLE SODIUM 20 MG/1
40 TABLET, DELAYED RELEASE ORAL ONCE
Refills: 0 | Status: COMPLETED | OUTPATIENT
Start: 2023-08-08 | End: 2023-08-08

## 2023-08-08 RX ORDER — ONDANSETRON 8 MG/1
4 TABLET, FILM COATED ORAL ONCE
Refills: 0 | Status: COMPLETED | OUTPATIENT
Start: 2023-08-08 | End: 2023-08-08

## 2023-08-08 RX ADMIN — Medication 400 MILLIGRAM(S): at 08:43

## 2023-08-08 RX ADMIN — IOHEXOL 30 MILLILITER(S): 300 INJECTION, SOLUTION INTRAVENOUS at 08:43

## 2023-08-08 RX ADMIN — SODIUM CHLORIDE 1000 MILLILITER(S): 9 INJECTION INTRAMUSCULAR; INTRAVENOUS; SUBCUTANEOUS at 08:42

## 2023-08-08 RX ADMIN — ONDANSETRON 4 MILLIGRAM(S): 8 TABLET, FILM COATED ORAL at 08:42

## 2023-08-08 RX ADMIN — PANTOPRAZOLE SODIUM 40 MILLIGRAM(S): 20 TABLET, DELAYED RELEASE ORAL at 08:42

## 2023-08-08 NOTE — ED ADULT TRIAGE NOTE - CHIEF COMPLAINT QUOTE
38F ambulatory from home, c/o epigastric/RUQ pain and nausea x 2 weeks, worsening past few days. unable to keep any food down, pain worsens with food.  99.1 temp in triage. pt denies vomiting/diarrhea, urinary sx or constipation/diarrhea

## 2023-08-08 NOTE — ED PROVIDER NOTE - OBJECTIVE STATEMENT
38-year-old female no reported medical history here complaining of abdominal pain.  Pain has been progressing for the past week or so.  Described as a discomfort/tightness in her epigastric area with radiation to her right side and back.  Patient also at times feels it going into her chest.  Symptoms worse with food.  Patient also with nausea without vomiting.  Patient tried taking an acids without relief.  Patient started to have low-grade fevers so came to the ER.  No vomiting, no diarrhea or constipation.  Patient denies dysuria or hematuria.  Last menstrual period about 1 week ago.  Patient reports she has appointment with Dr. Fine gastroenterologist tomorrow.

## 2023-08-08 NOTE — ED PROVIDER NOTE - PROGRESS NOTE DETAILS
results discussed with patient, will discharge- patient to follow up with GI ASAP, already has appointment.

## 2023-08-08 NOTE — ED ADULT NURSE NOTE - NSFALLUNIVINTERV_ED_ALL_ED
Bed/Stretcher in lowest position, wheels locked, appropriate side rails in place/Call bell, personal items and telephone in reach/Instruct patient to call for assistance before getting out of bed/chair/stretcher/Non-slip footwear applied when patient is off stretcher/[place] to call system/Physically safe environment - no spills, clutter or unnecessary equipment/Purposeful proactive rounding/Room/bathroom lighting operational, light cord in reach

## 2023-08-08 NOTE — ED PROVIDER NOTE - CLINICAL SUMMARY MEDICAL DECISION MAKING FREE TEXT BOX
Here with worsening epigastric pain, now more right-sided.  Check labs, imaging, treat symptoms, reevaluate.

## 2023-08-08 NOTE — ED ADULT NURSE NOTE - OBJECTIVE STATEMENT
Patient is a 38-year-old female no reported medical history here complaining of abdominal pain.  Pain has been progressing for the past week.  Described as a discomfort/tightness in her epigastric area with radiation to her right side and back.  Patient also at times feels it going into her chest.  Symptoms worse with food.  Patient also with nausea without vomiting.  Patient tried taking an acids without relief.  Patient started to have low-grade fevers so came to the ER.  No vomiting, no diarrhea or constipation.  Patient denies dysuria or hematuria.  Last menstrual period about 1 week ago.

## 2023-08-08 NOTE — ED PROVIDER NOTE - NSFOLLOWUPINSTRUCTIONS_ED_ALL_ED_FT
Please follow up with your Primary Care Physician and any specialists as discussed.  Please take your medications as prescribed and or instructed.  If your symptoms persist or worsen, please seek care. Either return to the Emergency Department, go to urgent care or see your primary care doctor.  Please refer to general information and instructions attached or below:     Acute Abdominal Pain    WHAT YOU NEED TO KNOW:    The cause of your abdominal pain may not be found. If a cause is found, treatment will depend on what the cause is.     DISCHARGE INSTRUCTIONS:    Return to the emergency department if:     You vomit blood or cannot stop vomiting.      You have blood in your bowel movement or it looks like tar.       You have bleeding from your rectum.       Your abdomen is larger than usual, more painful, and hard.       You have severe pain in your abdomen.       You stop passing gas and having bowel movements.       You feel weak, dizzy, or faint.    Contact your healthcare provider if:     You have a fever.      You have new signs and symptoms.      Your symptoms do not get better with treatment.       You have questions or concerns about your condition or care.    Medicines may be given to decrease pain, treat an infection, and manage your symptoms. Take your medicine as directed. Call your healthcare provider if you think your medicine is not helping or if you have side effects. Tell him if you are allergic to any medicine. Keep a list of the medicines, vitamins, and herbs you take. Include the amounts, and when and why you take them. Bring the list or the pill bottles to follow-up visits. Carry your medicine list with you in case of an emergency.    Manage your symptoms:     Apply heat on your abdomen for 20 to 30 minutes every 2 hours for as many days as directed. Heat helps decrease pain and muscle spasms.       Manage your stress. Stress may cause abdominal pain. Your healthcare provider may recommend relaxation techniques and deep breathing exercises to help decrease your stress. Your healthcare provider may recommend you talk to someone about your stress or anxiety, such as a counselor or a trusted friend. Get plenty of sleep and exercise regularly.       Limit or do not drink alcohol. Alcohol can make your abdominal pain worse. Ask your healthcare provider if it is safe for you to drink alcohol. Also ask how much is safe for you to drink.       Do not smoke. Nicotine and other chemicals in cigarettes can damage your esophagus and stomach. Ask your healthcare provider for information if you currently smoke and need help to quit. E-cigarettes or smokeless tobacco still contain nicotine. Talk to your healthcare provider before you use these products.     Make changes to the food you eat as directed: Do not eat foods that cause abdominal pain or other symptoms. Eat small meals more often.     Eat more high-fiber foods if you are constipated. High-fiber foods include fruits, vegetables, whole-grain foods, and legumes.       Do not eat foods that cause gas if you have bloating. Examples include broccoli, cabbage, and cauliflower. Do not drink soda or carbonated drinks, because these may also cause gas.       Do not eat foods or drinks that contain sorbitol or fructose if you have diarrhea and bloating. Some examples are fruit juices, candy, jelly, and sugar-free gum.       Do not eat high-fat foods, such as fried foods, cheeseburgers, hot dogs, and desserts.      Limit or do not drink caffeine. Caffeine may make symptoms, such as heart burn or nausea, worse.       Drink plenty of liquids to prevent dehydration from diarrhea or vomiting. Ask your healthcare provider how much liquid to drink each day and which liquids are best for you. Please follow up with your Primary Care Physician and any specialists as discussed- follow up with your GI doctor.  Please take your medications as prescribed and or instructed.  If your symptoms persist or worsen, please seek care. Either return to the Emergency Department, go to urgent care or see your primary care doctor.  Please refer to general information and instructions attached or below:     Acute Abdominal Pain    WHAT YOU NEED TO KNOW:    The cause of your abdominal pain may not be found. If a cause is found, treatment will depend on what the cause is.     DISCHARGE INSTRUCTIONS:    Return to the emergency department if:     You vomit blood or cannot stop vomiting.      You have blood in your bowel movement or it looks like tar.       You have bleeding from your rectum.       Your abdomen is larger than usual, more painful, and hard.       You have severe pain in your abdomen.       You stop passing gas and having bowel movements.       You feel weak, dizzy, or faint.    Contact your healthcare provider if:     You have a fever.      You have new signs and symptoms.      Your symptoms do not get better with treatment.       You have questions or concerns about your condition or care.    Medicines may be given to decrease pain, treat an infection, and manage your symptoms. Take your medicine as directed. Call your healthcare provider if you think your medicine is not helping or if you have side effects. Tell him if you are allergic to any medicine. Keep a list of the medicines, vitamins, and herbs you take. Include the amounts, and when and why you take them. Bring the list or the pill bottles to follow-up visits. Carry your medicine list with you in case of an emergency.    Manage your symptoms:     Apply heat on your abdomen for 20 to 30 minutes every 2 hours for as many days as directed. Heat helps decrease pain and muscle spasms.       Manage your stress. Stress may cause abdominal pain. Your healthcare provider may recommend relaxation techniques and deep breathing exercises to help decrease your stress. Your healthcare provider may recommend you talk to someone about your stress or anxiety, such as a counselor or a trusted friend. Get plenty of sleep and exercise regularly.       Limit or do not drink alcohol. Alcohol can make your abdominal pain worse. Ask your healthcare provider if it is safe for you to drink alcohol. Also ask how much is safe for you to drink.       Do not smoke. Nicotine and other chemicals in cigarettes can damage your esophagus and stomach. Ask your healthcare provider for information if you currently smoke and need help to quit. E-cigarettes or smokeless tobacco still contain nicotine. Talk to your healthcare provider before you use these products.     Make changes to the food you eat as directed: Do not eat foods that cause abdominal pain or other symptoms. Eat small meals more often.     Eat more high-fiber foods if you are constipated. High-fiber foods include fruits, vegetables, whole-grain foods, and legumes.       Do not eat foods that cause gas if you have bloating. Examples include broccoli, cabbage, and cauliflower. Do not drink soda or carbonated drinks, because these may also cause gas.       Do not eat foods or drinks that contain sorbitol or fructose if you have diarrhea and bloating. Some examples are fruit juices, candy, jelly, and sugar-free gum.       Do not eat high-fat foods, such as fried foods, cheeseburgers, hot dogs, and desserts.      Limit or do not drink caffeine. Caffeine may make symptoms, such as heart burn or nausea, worse.       Drink plenty of liquids to prevent dehydration from diarrhea or vomiting. Ask your healthcare provider how much liquid to drink each day and which liquids are best for you.

## 2023-08-08 NOTE — ED PROVIDER NOTE - PATIENT PORTAL LINK FT
You can access the FollowMyHealth Patient Portal offered by [facility] by registering at the following website: [email or web address]. By joining [employer]’s FollowMyHealth portal, you will also be able to view your health information using other applications (apps) compatible with our system.

## 2024-07-21 ENCOUNTER — EMERGENCY (EMERGENCY)
Facility: HOSPITAL | Age: 39
LOS: 1 days | Discharge: HOME | End: 2024-07-21
Attending: EMERGENCY MEDICINE | Admitting: EMERGENCY MEDICINE
Payer: COMMERCIAL

## 2024-07-21 VITALS
OXYGEN SATURATION: 100 % | HEART RATE: 98 BPM | HEIGHT: 66 IN | RESPIRATION RATE: 18 BRPM | TEMPERATURE: 98 F | SYSTOLIC BLOOD PRESSURE: 137 MMHG | WEIGHT: 134.92 LBS | DIASTOLIC BLOOD PRESSURE: 87 MMHG

## 2024-07-21 VITALS
DIASTOLIC BLOOD PRESSURE: 74 MMHG | RESPIRATION RATE: 14 BRPM | SYSTOLIC BLOOD PRESSURE: 128 MMHG | OXYGEN SATURATION: 99 % | TEMPERATURE: 98 F | HEART RATE: 80 BPM

## 2024-07-21 DIAGNOSIS — Z90.49 ACQUIRED ABSENCE OF OTHER SPECIFIED PARTS OF DIGESTIVE TRACT: Chronic | ICD-10-CM

## 2024-07-21 DIAGNOSIS — Z87.19 PERSONAL HISTORY OF OTHER DISEASES OF THE DIGESTIVE SYSTEM: Chronic | ICD-10-CM

## 2024-07-21 PROCEDURE — 96374 THER/PROPH/DIAG INJ IV PUSH: CPT

## 2024-07-21 PROCEDURE — 99284 EMERGENCY DEPT VISIT MOD MDM: CPT

## 2024-07-21 PROCEDURE — 99284 EMERGENCY DEPT VISIT MOD MDM: CPT | Mod: 25

## 2024-07-21 PROCEDURE — 96375 TX/PRO/DX INJ NEW DRUG ADDON: CPT

## 2024-07-21 RX ORDER — SODIUM CHLORIDE 0.9 % (FLUSH) 0.9 %
1000 SYRINGE (ML) INJECTION ONCE
Refills: 0 | Status: COMPLETED | OUTPATIENT
Start: 2024-07-21 | End: 2024-07-21

## 2024-07-21 RX ORDER — EPINEPHRINE 0.3 MG/.3ML
0.3 INJECTION SUBCUTANEOUS
Qty: 4 | Refills: 0
Start: 2024-07-21 | End: 2024-07-23

## 2024-07-21 RX ORDER — METHYLPREDNISOLONE ACETATE 20 MG/ML
125 VIAL (ML) INJECTION ONCE
Refills: 0 | Status: COMPLETED | OUTPATIENT
Start: 2024-07-21 | End: 2024-07-21

## 2024-07-21 RX ORDER — PREDNISONE 10 MG/1
1 TABLET ORAL
Qty: 20 | Refills: 0
Start: 2024-07-21 | End: 2024-07-28

## 2024-07-21 RX ORDER — FAMOTIDINE 40 MG
20 TABLET ORAL ONCE
Refills: 0 | Status: COMPLETED | OUTPATIENT
Start: 2024-07-21 | End: 2024-07-21

## 2024-07-21 RX ORDER — DIPHENHYDRAMINE HCL 12.5MG/5ML
25 ELIXIR ORAL ONCE
Refills: 0 | Status: COMPLETED | OUTPATIENT
Start: 2024-07-21 | End: 2024-07-21

## 2024-07-21 RX ADMIN — Medication 1000 MILLILITER(S): at 19:13

## 2024-07-21 RX ADMIN — Medication 125 MILLIGRAM(S): at 19:15

## 2024-07-21 RX ADMIN — Medication 25 MILLIGRAM(S): at 19:13

## 2024-07-21 RX ADMIN — Medication 20 MILLIGRAM(S): at 19:16

## 2024-07-21 NOTE — ED PROVIDER NOTE - NSFOLLOWUPINSTRUCTIONS_ED_ALL_ED_FT
Follow up with allergist  return to er for any worsening symptoms       Anaphylaxis    AMBULATORY CARE:    Anaphylaxis is a life-threatening allergic reaction that must be treated immediately. Your risk for anaphylaxis increases if you have asthma or allergies. Medical conditions such as heart disease can also increase your risk. It is important to be prepared if you are at risk for anaphylaxis. Your symptoms can be worse each time you are exposed to a trigger.    Steps to take for signs or symptoms of anaphylaxis:    Immediately give 1 shot of epinephrine only into the outer thigh muscle. Even if your allergic reaction seems mild, it can quickly become anaphylaxis. This may happen even if you had a mild reaction to the allergen in the past. Each exposure can cause a different reaction. Watch for signs and symptoms of anaphylaxis every time you are exposed to a trigger. Be ready to give a shot of epinephrine. It is okay to inject epinephrine through clothing. Just be careful to avoid seams, zippers, or other parts that can prevent the needle from entering your skin.  How to Give An Epinephrine Shot Adult      Leave the shot in place as directed. Your healthcare provider may recommend you leave it in place for up to 10 seconds before you remove it. This helps make sure all of the epinephrine is delivered.    Call your local emergency number and go to the emergency department, even if the shot improved your symptoms. Do not drive yourself. Bring the used epinephrine shot with you.  Call your local emergency number (911 in the [place]) if:    You have a skin rash, hives, swelling, or itching.    You have trouble breathing, shortness of breath, wheezing, or coughing.    Your throat tightens or your lips or tongue swell.    You have difficulty swallowing or speaking.    You are dizzy, lightheaded, confused, or feel like you are going to faint.    You have nausea, diarrhea, or abdominal cramps, or you are vomiting.  Seek care immediately if:    Signs or symptoms of anaphylaxis return.    Call your doctor or specialist if:    You have questions or concerns about your condition or care.    Common triggers: The following are some of the most common triggers:    Milk, peanuts, tree nuts, eggs, shellfish, wheat, and soy    Stings from bees, wasps, or fire ants    Antibiotics, NSAIDs, or aspirin    Latex    Exercise following exposure to another trigger, such as after you eat a trigger food  Common signs and symptoms: You may have any of the following within seconds to hours after exposure to a trigger:    Trouble breathing, shortness of breath, wheezing, or coughing    Throat tightening, swelling of the lips or tongue, or trouble swallowing    Rash, hives, swelling, or itching    Nausea, vomiting, diarrhea, or abdominal cramps    Dizziness, lightheadedness, fainting, or confusion    Sudden behavior changes or irritability  Treatment will depend on how severe your reaction was and if you had a reaction before. You may need any of the following:    Epinephrine is medicine used to treat severe allergic reactions such as anaphylaxis. It is given as a shot into the outer thigh muscle.    Medicines such as antihistamines, steroids, and bronchodilators decrease inflammation, open airways, and make breathing easier.    Intravenous (IV) fluids may be given to treat low blood pressure caused by anaphylaxis or epinephrine.    Oxygen may be needed if your blood oxygen level is lower than it should be. You may get oxygen through a mask placed over your nose and mouth or through small tubes placed in your nostrils.  Safety precautions:    Keep 2 shots of epinephrine with you at all times. You may need a second shot, because epinephrine only works for about 20 minutes and symptoms may return. Your healthcare provider can show you and family members how to give the shot. Check the expiration date every month and replace it before it expires.    Create an action plan. Your provider can help you create a written plan that explains the allergy and an emergency plan to treat a reaction. The plan explains when to give a second epinephrine shot if symptoms return or do not improve after the first. Give copies of the action plan and emergency instructions to family members, and work or school staff. Show them how to give a shot of epinephrine in case you are not able to give it to yourself.    Be careful when you exercise. If you have had exercise-induced anaphylaxis, do not exercise right after you eat. Stop exercising right away if you start to develop any signs or symptoms of anaphylaxis. You may first feel tired, warm, or have itchy skin. Hives, swelling, and severe breathing problems may develop if you continue to exercise.    Carry medical alert identification. Wear medical alert jewelry or carry a card that explains the allergy. Ask your provider where to get these items.  Medical Alert Jewelry      Identify and avoid known triggers. Read food labels for ingredients. Look for triggers in your environment.    Ask about treatments to prevent anaphylaxis. You may need allergy shots or other medicines to treat allergies.  Follow up with your doctor or specialist as directed: Allergy testing may reveal allergies that can trigger anaphylaxis. Write down your questions so you remember to ask them during your visits.

## 2024-07-21 NOTE — ED PROVIDER NOTE - ATTENDING APP SHARED VISIT CONTRIBUTION OF CARE
Examination reveals a well-developed well-nourished somewhat anxious female in no acute distress with clear oropharynx clear lungs no wheezing skin exam that reveals mild blanching erythema maculopapular eruption to cheeks upper chest wall bilateral arms.  I agree with plan and management outlined by PA.

## 2024-07-21 NOTE — ED PROVIDER NOTE - CLINICAL SUMMARY MEDICAL DECISION MAKING FREE TEXT BOX
Patient is 38 years old presents emergency department here at [facility] complaining of rash and mild shortness of breath.  Patient's been having recurrent unexplained episodes of pruritic rash predominantly around her eyes face arms upper chest for the past month and for which she has been on and off of Medrol Dosepak Benadryl and Pepcid.  As result of another flareup.  Patient presents here for evaluation.  No fever no chills no cough.  No new soaps colognes detergents foods.  Large amount of dust at home which is new secondary to construction.  This case will require thorough evaluation independent history and physical done by myself followed by medications.

## 2024-07-21 NOTE — ED PROVIDER NOTE - PROGRESS NOTE DETAILS
pt feeling better epipen sent advised longer taper dose of steroids and f/u [facility] allergist

## 2024-07-21 NOTE — ED PROVIDER NOTE - PATIENT PORTAL LINK FT
You can access the FollowMyHealth Patient Portal offered by [facility] by registering at the following website: [email or web address]. By joining [employer]’s FollowMyHealth portal, you will also be able to view your health information using other applications (apps) compatible with our system.

## 2024-07-21 NOTE — ED ADULT NURSE NOTE - OBJECTIVE STATEMENT
Reports intermittent facial swelling and redness x1 month. Been seen outpt for this and not diagnosed with anything. Today pt woke up with worsening facial swelling and redness, b/l upper arm itching and redness and reports "tightness" in her throat and states "it feels like I need to make an effort to breath". Pt able to talk in complete sentences. No lip or tongue swelling. Does not appear in distress. Respiratory rate 18 and 100% O2 sat on room air now. Denies chest pain. Currently taking Benadryl and steroid dose pack.

## 2024-07-21 NOTE — ED ADULT TRIAGE NOTE - CHIEF COMPLAINT QUOTE
pt A&Ox4 ambulatory to triage with complaints of shortness of breath. has construction being done in house and breathed in dust. has seen dermatologist for intermittent rash to face x about one month and has been on benadryl and steroids. o2 sat WNL on RA, speaking in full sentences. took 50mg benadryl prior to arrival

## 2024-07-21 NOTE — ED ADULT NURSE NOTE - NSFALLUNIVINTERV_ED_ALL_ED
Bed/Stretcher in lowest position, wheels locked, appropriate side rails in place/Call bell, personal items and telephone in reach/Instruct patient to call for assistance before getting out of bed/chair/stretcher/Non-slip footwear applied when patient is off stretcher/[place] to call system/Physically safe environment - no spills, clutter or unnecessary equipment/Purposeful proactive rounding/Room/bathroom lighting operational, light cord in reach

## 2024-07-21 NOTE — ED ADULT TRIAGE NOTE - AS PAIN REST
Problem: Gas Exchange - Impaired:  Goal: Levels of oxygenation will improve  Description  Levels of oxygenation will improve  Outcome: Met This Shift     Problem: Pain:  Goal: Control of chronic pain  Description  Control of chronic pain  Outcome: Met This Shift 0 (no pain/absence of nonverbal indicators of pain)

## 2024-07-21 NOTE — ED PROVIDER NOTE - SKIN, MLM
Skin normal color for race, warm, dry and intact. Skin normal color for race, warm, dry and intact. mild redness noted to b/l upper arms

## 2024-07-21 NOTE — ED ADULT NURSE REASSESSMENT NOTE - NS ED NURSE REASSESS COMMENT FT1
Pt medicated per orders. Reports feeling dizzy after benadryl administration, both side rails up, bed in lowest position, call bell within reach, and vitals done - pt given water at her request. will maintain safety and continue to monitor.

## 2024-07-21 NOTE — ED PROVIDER NOTE - ENMT, MLM
Airway patent, Nasal mucosa clear. Mouth with normal mucosa. Throat has no vesicles, no oropharyngeal exudates and uvula is midline. erythema to b/l cheeks normal speech no drooling

## 2024-07-21 NOTE — ED PROVIDER NOTE - OBJECTIVE STATEMENT
Patient is a 38-year-old female with no past medical history coming in for allergic reaction for the last month intermittently.  Patient states she has been having construction about in her house and thought was related to that.  Patient states Friday she was at work wearing N95 mask and symptoms got worse and she noted swelling in her face and thought symptoms were due to.  Patient states she has been taking Benadryl around-the-clock saw dermatologist who advised her to take antihistamines.  Patient states she started Medrol Dosepak Friday took a dose today.  Patient states she got acute swelling on her face today after vacuuming and dusting.  Patient states she felt like she had a lump in her throat so came to emergency room.  Patient took 15 Benadryl prior to arrival.  Patient has nausea vomiting abdominal pain.  States she also noted rash on bilateral arms.

## 2024-12-11 NOTE — ED ADULT NURSE NOTE - CHPI ED NUR DURATION
month(s)
PAST MEDICAL HISTORY:  History of neck pain     History of right inguinal hernia     HLD (hyperlipidemia)

## 2025-06-16 NOTE — ED PROVIDER NOTE - PHYSICAL EXAMINATION
Postoperative Visit    Keiko Araujo is a 34 y.o. female , S/p Diagnostic Laparoscopy, Hysteroscopy with dilation and curettage 25     The patient was seen. She has no chief complaint today.     She is tolerating oral intake. She denies any dizziness or shortness of breath or chest pain.  Her bowels are regular and she denies any urinary tract symptomology. Patient denies headache, nausea, vomiting, fever, chills, abdominal pain, diarrhea, change in color/amount/odor of vaginal discharge, dysuria or, hematuria.     Patient Active Problem List   Diagnosis    Crushing injury of finger, left    Family history of diabetes mellitus    Cervical polyp    Endometriosis determined by laparoscopy    Endometrial polyp    Pre-Pregnancy BMI 35    Hx LE swelling in previous pregnancy    Vagina bleeding    Spontaneous  @15 weeks    Endometriosis    Fibroids    S/p Diagnostic Laparoscopy, Hysteroscopy with dilation and curettage 25       Vitals:   Blood pressure 108/63, pulse 80, height 1.753 m (5' 9\"), weight 108.1 kg (238 lb 6.4 oz), last menstrual period 2025, unknown if currently breastfeeding.    Physical Exam:  General:  no apparent distress, alert, and cooperative  Lungs:  No increased work of breathing, good air exchange, normal chest wall rise bilaterally  Heart:  regular rate and rhythm and no murmur    Abdomen: Abdomen soft, non-tender. BS normal. No masses,  No organomegaly  Incision: clean, dry, and intact  Extremities:  no calf tenderness, non edematous    Assessment:  Keiko Araujo is a 34 y.o. female , S/p Diagnostic Laparoscopy, Hysteroscopy with dilation and curettage 25    - Doing well, continue routine post operative care   - Pathology reviewed   - Discussed cervical fibroid and that surgical removal and uterine sparing would be complex and likely need to be completed by MIGs surgeon. Patient has a Hx if 1 loss @ 15 weeks and 1 FTD. She can pursue MIGs or try 
Vital signs as available reviewed.  General:  No acute distress.  Head:  Normocephalic, atraumatic.  Eyes:  Conjunctiva pink, no icterus.  Cardiovascular:  Regular rate, no obvious murmur.  Respiratory:  Clear to auscultation, good air entry bilaterally.  Abdomen:  Soft, Mild epigastric tenderness to palpation.  Musculoskeletal:  No obvious deformity.  Neurologic: Alert and oriented, moving all extremities.  Skin:  Warm and dry.